# Patient Record
Sex: FEMALE | Race: WHITE | NOT HISPANIC OR LATINO | Employment: OTHER | URBAN - METROPOLITAN AREA
[De-identification: names, ages, dates, MRNs, and addresses within clinical notes are randomized per-mention and may not be internally consistent; named-entity substitution may affect disease eponyms.]

---

## 2017-02-01 ENCOUNTER — HOSPITAL ENCOUNTER (OUTPATIENT)
Dept: RADIOLOGY | Facility: HOSPITAL | Age: 72
Discharge: HOME | End: 2017-02-01
Attending: ORTHOPAEDIC SURGERY
Payer: COMMERCIAL

## 2017-02-01 ENCOUNTER — ALLSCRIPTS OFFICE VISIT (OUTPATIENT)
Dept: OTHER | Facility: OTHER | Age: 72
End: 2017-02-01

## 2017-02-01 DIAGNOSIS — M16.11 PRIMARY OSTEOARTHRITIS OF RIGHT HIP: ICD-10-CM

## 2017-02-01 DIAGNOSIS — I48.91 ATRIAL FIBRILLATION (HCC): ICD-10-CM

## 2017-02-01 DIAGNOSIS — Z48.89 ENCOUNTER FOR OTHER SPECIFIED SURGICAL AFTERCARE: ICD-10-CM

## 2017-02-01 PROCEDURE — 73100 X-RAY EXAM OF WRIST: CPT

## 2017-03-01 ENCOUNTER — ALLSCRIPTS OFFICE VISIT (OUTPATIENT)
Dept: OTHER | Facility: OTHER | Age: 72
End: 2017-03-01

## 2017-03-01 ENCOUNTER — HOSPITAL ENCOUNTER (OUTPATIENT)
Dept: RADIOLOGY | Facility: HOSPITAL | Age: 72
Discharge: HOME | End: 2017-03-01
Attending: ORTHOPAEDIC SURGERY
Payer: COMMERCIAL

## 2017-03-01 DIAGNOSIS — Z48.89 ENCOUNTER FOR OTHER SPECIFIED SURGICAL AFTERCARE: ICD-10-CM

## 2017-03-01 PROCEDURE — 73100 X-RAY EXAM OF WRIST: CPT

## 2017-03-07 ENCOUNTER — TRANSCRIBE ORDERS (OUTPATIENT)
Dept: ADMINISTRATIVE | Facility: HOSPITAL | Age: 72
End: 2017-03-07

## 2017-03-07 DIAGNOSIS — C55 MALIGNANT NEOPLASM OF UTERUS, PART UNSPECIFIED (HCC): ICD-10-CM

## 2017-03-07 DIAGNOSIS — C55 MALIGNANT NEOPLASM OF UTERUS (HCC): ICD-10-CM

## 2017-03-07 DIAGNOSIS — C78.00 MALIGNANT NEOPLASM METASTATIC TO LUNG, UNSPECIFIED LATERALITY (HCC): Primary | ICD-10-CM

## 2017-03-07 DIAGNOSIS — C78.00 SECONDARY MALIGNANT NEOPLASM OF LUNG (HCC): ICD-10-CM

## 2017-03-07 DIAGNOSIS — C78.7 SECONDARY MALIGNANT NEOPLASM OF LIVER AND INTRAHEPATIC BILE DUCT (HCC): ICD-10-CM

## 2017-03-17 ENCOUNTER — HOSPITAL ENCOUNTER (OUTPATIENT)
Dept: INFUSION CENTER | Facility: CLINIC | Age: 72
Discharge: HOME | End: 2017-03-17
Payer: COMMERCIAL

## 2017-03-17 LAB
ALBUMIN SERPL BCP-MCNC: 3.2 G/DL (ref 3.5–5)
ALP SERPL-CCNC: 354 U/L (ref 46–116)
ALT SERPL W P-5'-P-CCNC: 32 U/L (ref 12–78)
ANION GAP SERPL CALCULATED.3IONS-SCNC: 8 MMOL/L (ref 4–13)
APTT PPP: 32 SECONDS (ref 24–36)
AST SERPL W P-5'-P-CCNC: 42 U/L (ref 5–45)
BASOPHILS # BLD AUTO: 0.03 THOUSANDS/ΜL (ref 0–0.1)
BASOPHILS NFR BLD AUTO: 0 % (ref 0–1)
BILIRUB SERPL-MCNC: 0.7 MG/DL (ref 0.2–1)
BUN SERPL-MCNC: 30 MG/DL (ref 5–25)
CALCIUM SERPL-MCNC: 8.7 MG/DL (ref 8.3–10.1)
CANCER AG125 SERPL-ACNC: 25.2 U/ML (ref 0–30)
CHLORIDE SERPL-SCNC: 104 MMOL/L (ref 100–108)
CO2 SERPL-SCNC: 28 MMOL/L (ref 21–32)
CREAT SERPL-MCNC: 0.97 MG/DL (ref 0.6–1.3)
EOSINOPHIL # BLD AUTO: 0.4 THOUSAND/ΜL (ref 0–0.61)
EOSINOPHIL NFR BLD AUTO: 6 % (ref 0–6)
ERYTHROCYTE [DISTWIDTH] IN BLOOD BY AUTOMATED COUNT: 16.1 % (ref 11.6–15.1)
GFR SERPL CREATININE-BSD FRML MDRD: 56.9 ML/MIN/1.73SQ M
GLUCOSE SERPL-MCNC: 102 MG/DL (ref 65–140)
HCT VFR BLD AUTO: 33.2 % (ref 34.8–46.1)
HGB BLD-MCNC: 10.3 G/DL (ref 11.5–15.4)
INR PPP: 1.34 (ref 0.86–1.16)
LYMPHOCYTES # BLD AUTO: 0.62 THOUSANDS/ΜL (ref 0.6–4.47)
LYMPHOCYTES NFR BLD AUTO: 9 % (ref 14–44)
MCH RBC QN AUTO: 30.8 PG (ref 26.8–34.3)
MCHC RBC AUTO-ENTMCNC: 31 G/DL (ref 31.4–37.4)
MCV RBC AUTO: 99 FL (ref 82–98)
MONOCYTES # BLD AUTO: 0.48 THOUSAND/ΜL (ref 0.17–1.22)
MONOCYTES NFR BLD AUTO: 7 % (ref 4–12)
NEUTROPHILS # BLD AUTO: 5.16 THOUSANDS/ΜL (ref 1.85–7.62)
NEUTS SEG NFR BLD AUTO: 78 % (ref 43–75)
PLATELET # BLD AUTO: 337 THOUSANDS/UL (ref 149–390)
PMV BLD AUTO: 8.7 FL (ref 8.9–12.7)
POTASSIUM SERPL-SCNC: 4 MMOL/L (ref 3.5–5.3)
PROT SERPL-MCNC: 7.2 G/DL (ref 6.4–8.2)
PROTHROMBIN TIME: 16.2 SECONDS (ref 12–14.3)
RBC # BLD AUTO: 3.34 MILLION/UL (ref 3.81–5.12)
SODIUM SERPL-SCNC: 140 MMOL/L (ref 136–145)
WBC # BLD AUTO: 6.69 THOUSAND/UL (ref 4.31–10.16)

## 2017-03-17 PROCEDURE — 85730 THROMBOPLASTIN TIME PARTIAL: CPT | Performed by: NURSE PRACTITIONER

## 2017-03-17 PROCEDURE — 80053 COMPREHEN METABOLIC PANEL: CPT | Performed by: NURSE PRACTITIONER

## 2017-03-17 PROCEDURE — 85610 PROTHROMBIN TIME: CPT | Performed by: NURSE PRACTITIONER

## 2017-03-17 PROCEDURE — 85025 COMPLETE CBC W/AUTO DIFF WBC: CPT | Performed by: NURSE PRACTITIONER

## 2017-03-17 PROCEDURE — 86304 IMMUNOASSAY TUMOR CA 125: CPT | Performed by: NURSE PRACTITIONER

## 2017-03-17 RX ADMIN — HEPARIN 300 UNITS: 100 SYRINGE at 14:00

## 2017-03-17 NOTE — PROGRESS NOTES
Pt offers no complaints today  Port accessed and labs drawn   Has next appointment made, declines AVS

## 2017-03-17 NOTE — PLAN OF CARE
Problem: Potential for Falls  Goal: Patient will remain free of falls  INTERVENTIONS:  - Assess patient frequently for physical needs  - Identify cognitive and physical deficits and behaviors that affect risk of falls    - [place] fall precautions as indicated by assessment   - Educate patient/family on patient safety including physical limitations  - Instruct patient to call for assistance with activity based on assessment  - Modify environment to reduce risk of injury  - Consider OT/PT consult to assist with strengthening/mobility   Outcome: Progressing

## 2017-03-21 ENCOUNTER — HOSPITAL ENCOUNTER (OUTPATIENT)
Dept: CT IMAGING | Facility: HOSPITAL | Age: 72
Discharge: HOME | End: 2017-03-21
Attending: OBSTETRICS & GYNECOLOGY
Payer: COMMERCIAL

## 2017-03-21 DIAGNOSIS — C78.00 SECONDARY MALIGNANT NEOPLASM OF LUNG (HCC): ICD-10-CM

## 2017-03-21 DIAGNOSIS — C78.7 SECONDARY MALIGNANT NEOPLASM OF LIVER AND INTRAHEPATIC BILE DUCT (HCC): ICD-10-CM

## 2017-03-21 DIAGNOSIS — C55 MALIGNANT NEOPLASM OF UTERUS (HCC): ICD-10-CM

## 2017-03-21 PROCEDURE — 74177 CT ABD & PELVIS W/CONTRAST: CPT

## 2017-03-21 PROCEDURE — 71260 CT THORAX DX C+: CPT

## 2017-03-21 PROCEDURE — 70470 CT HEAD/BRAIN W/O & W/DYE: CPT

## 2017-03-21 RX ADMIN — IOHEXOL 100 ML: 350 INJECTION, SOLUTION INTRAVENOUS at 17:09

## 2017-04-06 ENCOUNTER — ALLSCRIPTS OFFICE VISIT (OUTPATIENT)
Dept: OTHER | Facility: OTHER | Age: 72
End: 2017-04-06

## 2017-04-27 ENCOUNTER — ALLSCRIPTS OFFICE VISIT (OUTPATIENT)
Dept: OTHER | Facility: OTHER | Age: 72
End: 2017-04-27

## 2017-05-19 DIAGNOSIS — I82.409 ACUTE EMBOLISM AND THROMBOSIS OF DEEP VEIN OF LOWER EXTREMITY (HCC): ICD-10-CM

## 2017-05-19 DIAGNOSIS — I48.91 ATRIAL FIBRILLATION (HCC): ICD-10-CM

## 2017-05-19 DIAGNOSIS — I10 ESSENTIAL (PRIMARY) HYPERTENSION: ICD-10-CM

## 2017-05-19 DIAGNOSIS — M16.11 PRIMARY OSTEOARTHRITIS OF RIGHT HIP: ICD-10-CM

## 2017-05-25 ENCOUNTER — ALLSCRIPTS OFFICE VISIT (OUTPATIENT)
Dept: OTHER | Facility: OTHER | Age: 72
End: 2017-05-25

## 2017-05-29 ENCOUNTER — HOSPITAL ENCOUNTER (INPATIENT)
Facility: HOSPITAL | Age: 72
LOS: 5 days | Discharge: NURSING, REHAB OR LONG-TERM CARE | DRG: 315 | End: 2017-06-04
Attending: EMERGENCY MEDICINE | Admitting: HOSPITALIST
Payer: COMMERCIAL

## 2017-05-29 ENCOUNTER — GENERIC CONVERSION - ENCOUNTER (OUTPATIENT)
Dept: OTHER | Facility: OTHER | Age: 72
End: 2017-05-29

## 2017-05-29 ENCOUNTER — APPOINTMENT (EMERGENCY)
Dept: RADIOLOGY | Facility: HOSPITAL | Age: 72
DRG: 315 | End: 2017-05-29
Payer: COMMERCIAL

## 2017-05-29 DIAGNOSIS — M25.559 ARTHRALGIA OF HIP: ICD-10-CM

## 2017-05-29 DIAGNOSIS — C55 MALIGNANT NEOPLASM OF UTERUS (HCC): ICD-10-CM

## 2017-05-29 DIAGNOSIS — R55 SYNCOPE: ICD-10-CM

## 2017-05-29 DIAGNOSIS — I27.20 PULMONARY HYPERTENSION (HCC): ICD-10-CM

## 2017-05-29 DIAGNOSIS — I48.91 ATRIAL FIBRILLATION WITH RVR (HCC): Primary | ICD-10-CM

## 2017-05-29 PROBLEM — I48.0 PAF (PAROXYSMAL ATRIAL FIBRILLATION) (HCC): Status: ACTIVE | Noted: 2017-05-29

## 2017-05-29 PROBLEM — I10 BENIGN ESSENTIAL HYPERTENSION: Status: ACTIVE | Noted: 2017-05-29

## 2017-05-29 PROBLEM — I35.0 AORTIC VALVE STENOSIS: Status: ACTIVE | Noted: 2017-05-29

## 2017-05-29 PROBLEM — I82.409 DEEP VEIN THROMBOSIS (DVT) (HCC): Status: ACTIVE | Noted: 2017-05-29

## 2017-05-29 PROBLEM — E78.5 DYSLIPIDEMIA: Status: ACTIVE | Noted: 2017-05-29

## 2017-05-29 PROBLEM — E66.01 MORBID OBESITY (HCC): Status: ACTIVE | Noted: 2017-05-29

## 2017-05-29 PROBLEM — M16.9 OSTEOARTHRITIS OF HIP: Status: ACTIVE | Noted: 2017-05-29

## 2017-05-29 LAB
ANION GAP SERPL CALCULATED.3IONS-SCNC: 10 MMOL/L (ref 4–13)
APTT PPP: 30 SECONDS (ref 23–35)
BASOPHILS # BLD AUTO: 0.02 THOUSANDS/ΜL (ref 0–0.1)
BASOPHILS NFR BLD AUTO: 0 % (ref 0–1)
BUN SERPL-MCNC: 38 MG/DL (ref 5–25)
CALCIUM SERPL-MCNC: 8.6 MG/DL (ref 8.3–10.1)
CHLORIDE SERPL-SCNC: 107 MMOL/L (ref 100–108)
CO2 SERPL-SCNC: 24 MMOL/L (ref 21–32)
CREAT SERPL-MCNC: 1.22 MG/DL (ref 0.6–1.3)
EOSINOPHIL # BLD AUTO: 0.1 THOUSAND/ΜL (ref 0–0.61)
EOSINOPHIL NFR BLD AUTO: 1 % (ref 0–6)
ERYTHROCYTE [DISTWIDTH] IN BLOOD BY AUTOMATED COUNT: 16.2 % (ref 11.6–15.1)
GFR SERPL CREATININE-BSD FRML MDRD: 43.7 ML/MIN/1.73SQ M
GLUCOSE SERPL-MCNC: 104 MG/DL (ref 65–140)
HCT VFR BLD AUTO: 32.8 % (ref 34.8–46.1)
HGB BLD-MCNC: 10.8 G/DL (ref 11.5–15.4)
INR PPP: 1.32 (ref 0.86–1.16)
LYMPHOCYTES # BLD AUTO: 0.56 THOUSANDS/ΜL (ref 0.6–4.47)
LYMPHOCYTES NFR BLD AUTO: 7 % (ref 14–44)
MCH RBC QN AUTO: 31.8 PG (ref 26.8–34.3)
MCHC RBC AUTO-ENTMCNC: 32.9 G/DL (ref 31.4–37.4)
MCV RBC AUTO: 97 FL (ref 82–98)
MONOCYTES # BLD AUTO: 0.6 THOUSAND/ΜL (ref 0.17–1.22)
MONOCYTES NFR BLD AUTO: 8 % (ref 4–12)
NEUTROPHILS # BLD AUTO: 6.52 THOUSANDS/ΜL (ref 1.85–7.62)
NEUTS SEG NFR BLD AUTO: 84 % (ref 43–75)
NRBC BLD AUTO-RTO: 0 /100 WBCS
PLATELET # BLD AUTO: 247 THOUSANDS/UL (ref 149–390)
PMV BLD AUTO: 9.4 FL (ref 8.9–12.7)
POTASSIUM SERPL-SCNC: 4.1 MMOL/L (ref 3.5–5.3)
PROTHROMBIN TIME: 16.5 SECONDS (ref 12.1–14.4)
RBC # BLD AUTO: 3.4 MILLION/UL (ref 3.81–5.12)
SODIUM SERPL-SCNC: 141 MMOL/L (ref 136–145)
SPECIMEN SOURCE: NORMAL
TROPONIN I BLD-MCNC: 0.03 NG/ML (ref 0–0.08)
WBC # BLD AUTO: 7.85 THOUSAND/UL (ref 4.31–10.16)

## 2017-05-29 PROCEDURE — 83735 ASSAY OF MAGNESIUM: CPT | Performed by: EMERGENCY MEDICINE

## 2017-05-29 PROCEDURE — 84484 ASSAY OF TROPONIN QUANT: CPT

## 2017-05-29 PROCEDURE — 71020 HB CHEST X-RAY 2VW FRONTAL&LATL: CPT

## 2017-05-29 PROCEDURE — 85610 PROTHROMBIN TIME: CPT | Performed by: EMERGENCY MEDICINE

## 2017-05-29 PROCEDURE — 80048 BASIC METABOLIC PNL TOTAL CA: CPT | Performed by: EMERGENCY MEDICINE

## 2017-05-29 PROCEDURE — 93005 ELECTROCARDIOGRAM TRACING: CPT

## 2017-05-29 PROCEDURE — 85025 COMPLETE CBC W/AUTO DIFF WBC: CPT | Performed by: EMERGENCY MEDICINE

## 2017-05-29 PROCEDURE — 99285 EMERGENCY DEPT VISIT HI MDM: CPT

## 2017-05-29 PROCEDURE — 96360 HYDRATION IV INFUSION INIT: CPT

## 2017-05-29 PROCEDURE — 85730 THROMBOPLASTIN TIME PARTIAL: CPT | Performed by: EMERGENCY MEDICINE

## 2017-05-29 PROCEDURE — 36415 COLL VENOUS BLD VENIPUNCTURE: CPT | Performed by: EMERGENCY MEDICINE

## 2017-05-29 PROCEDURE — A9270 NON-COVERED ITEM OR SERVICE: HCPCS | Performed by: EMERGENCY MEDICINE

## 2017-05-29 RX ORDER — SENNOSIDES 8.6 MG
1 TABLET ORAL DAILY
Status: DISCONTINUED | OUTPATIENT
Start: 2017-05-30 | End: 2017-05-30

## 2017-05-29 RX ORDER — TRAMADOL HYDROCHLORIDE 50 MG/1
50 TABLET ORAL EVERY 6 HOURS PRN
Status: DISCONTINUED | OUTPATIENT
Start: 2017-05-29 | End: 2017-05-30

## 2017-05-29 RX ORDER — CALCIUM CARBONATE 500(1250)
1 TABLET ORAL
Status: DISCONTINUED | OUTPATIENT
Start: 2017-05-30 | End: 2017-06-04 | Stop reason: HOSPADM

## 2017-05-29 RX ORDER — LISINOPRIL 20 MG/1
40 TABLET ORAL DAILY
Status: DISCONTINUED | OUTPATIENT
Start: 2017-05-30 | End: 2017-05-30

## 2017-05-29 RX ORDER — ASPIRIN 81 MG/1
81 TABLET, CHEWABLE ORAL DAILY
Status: DISCONTINUED | OUTPATIENT
Start: 2017-05-30 | End: 2017-06-04 | Stop reason: HOSPADM

## 2017-05-29 RX ORDER — ESCITALOPRAM OXALATE 20 MG/1
20 TABLET ORAL DAILY
Status: DISCONTINUED | OUTPATIENT
Start: 2017-05-30 | End: 2017-06-04 | Stop reason: HOSPADM

## 2017-05-29 RX ORDER — HYDROCHLOROTHIAZIDE 25 MG/1
25 TABLET ORAL DAILY
Status: DISCONTINUED | OUTPATIENT
Start: 2017-05-30 | End: 2017-05-30

## 2017-05-29 RX ORDER — CLONAZEPAM 0.5 MG/1
0.5 TABLET ORAL 2 TIMES DAILY PRN
Status: DISCONTINUED | OUTPATIENT
Start: 2017-05-29 | End: 2017-05-31

## 2017-05-29 RX ORDER — ACETAMINOPHEN 325 MG/1
650 TABLET ORAL EVERY 6 HOURS PRN
Status: DISCONTINUED | OUTPATIENT
Start: 2017-05-29 | End: 2017-05-30

## 2017-05-29 RX ORDER — FERROUS SULFATE 325(65) MG
325 TABLET ORAL
Status: DISCONTINUED | OUTPATIENT
Start: 2017-05-30 | End: 2017-06-04 | Stop reason: HOSPADM

## 2017-05-29 RX ORDER — MELATONIN
1000 DAILY
Status: DISCONTINUED | OUTPATIENT
Start: 2017-05-30 | End: 2017-06-04 | Stop reason: HOSPADM

## 2017-05-29 RX ORDER — ALPRAZOLAM 0.25 MG/1
0.25 TABLET ORAL
Status: DISCONTINUED | OUTPATIENT
Start: 2017-05-29 | End: 2017-06-04 | Stop reason: HOSPADM

## 2017-05-29 RX ORDER — OXYCODONE HYDROCHLORIDE 5 MG/1
5 TABLET ORAL ONCE
Status: COMPLETED | OUTPATIENT
Start: 2017-05-29 | End: 2017-05-29

## 2017-05-29 RX ORDER — ONDANSETRON 2 MG/ML
4 INJECTION INTRAMUSCULAR; INTRAVENOUS EVERY 6 HOURS PRN
Status: DISCONTINUED | OUTPATIENT
Start: 2017-05-29 | End: 2017-06-01

## 2017-05-29 RX ORDER — DOCUSATE SODIUM 100 MG/1
100 CAPSULE, LIQUID FILLED ORAL 2 TIMES DAILY
Status: DISCONTINUED | OUTPATIENT
Start: 2017-05-30 | End: 2017-05-30

## 2017-05-29 RX ADMIN — SODIUM CHLORIDE 1000 ML: 0.9 INJECTION, SOLUTION INTRAVENOUS at 21:49

## 2017-05-29 RX ADMIN — OXYCODONE HYDROCHLORIDE 5 MG: 5 TABLET ORAL at 22:11

## 2017-05-30 ENCOUNTER — GENERIC CONVERSION - ENCOUNTER (OUTPATIENT)
Dept: OTHER | Facility: OTHER | Age: 72
End: 2017-05-30

## 2017-05-30 ENCOUNTER — APPOINTMENT (INPATIENT)
Dept: NON INVASIVE DIAGNOSTICS | Facility: HOSPITAL | Age: 72
DRG: 315 | End: 2017-05-30
Payer: COMMERCIAL

## 2017-05-30 LAB
ANION GAP SERPL CALCULATED.3IONS-SCNC: 7 MMOL/L (ref 4–13)
ATRIAL RATE: 133 BPM
ATRIAL RATE: 68 BPM
ATRIAL RATE: 87 BPM
BUN SERPL-MCNC: 37 MG/DL (ref 5–25)
CALCIUM SERPL-MCNC: 8.2 MG/DL (ref 8.3–10.1)
CHLORIDE SERPL-SCNC: 108 MMOL/L (ref 100–108)
CO2 SERPL-SCNC: 25 MMOL/L (ref 21–32)
CREAT SERPL-MCNC: 0.96 MG/DL (ref 0.6–1.3)
GFR SERPL CREATININE-BSD FRML MDRD: 57.6 ML/MIN/1.73SQ M
GLUCOSE SERPL-MCNC: 95 MG/DL (ref 65–140)
MAGNESIUM SERPL-MCNC: 1.8 MG/DL (ref 1.6–2.6)
MAGNESIUM SERPL-MCNC: 1.8 MG/DL (ref 1.6–2.6)
P AXIS: 58 DEGREES
P AXIS: 94 DEGREES
PHOSPHATE SERPL-MCNC: 2.7 MG/DL (ref 2.3–4.1)
POTASSIUM SERPL-SCNC: 3.3 MMOL/L (ref 3.5–5.3)
PR INTERVAL: 180 MS
PR INTERVAL: 186 MS
QRS AXIS: -1 DEGREES
QRS AXIS: -1 DEGREES
QRS AXIS: 11 DEGREES
QRSD INTERVAL: 80 MS
QRSD INTERVAL: 84 MS
QRSD INTERVAL: 86 MS
QT INTERVAL: 336 MS
QT INTERVAL: 368 MS
QT INTERVAL: 426 MS
QTC INTERVAL: 435 MS
QTC INTERVAL: 442 MS
QTC INTERVAL: 452 MS
SODIUM SERPL-SCNC: 140 MMOL/L (ref 136–145)
T WAVE AXIS: 101 DEGREES
T WAVE AXIS: 122 DEGREES
T WAVE AXIS: 122 DEGREES
TROPONIN I SERPL-MCNC: 0.05 NG/ML
TROPONIN I SERPL-MCNC: 0.05 NG/ML
TSH SERPL DL<=0.05 MIU/L-ACNC: 1.27 UIU/ML (ref 0.36–3.74)
VENTRICULAR RATE: 101 BPM
VENTRICULAR RATE: 68 BPM
VENTRICULAR RATE: 87 BPM

## 2017-05-30 PROCEDURE — A9270 NON-COVERED ITEM OR SERVICE: HCPCS | Performed by: HOSPITALIST

## 2017-05-30 PROCEDURE — 83735 ASSAY OF MAGNESIUM: CPT | Performed by: HOSPITALIST

## 2017-05-30 PROCEDURE — A9270 NON-COVERED ITEM OR SERVICE: HCPCS | Performed by: NURSE PRACTITIONER

## 2017-05-30 PROCEDURE — 84484 ASSAY OF TROPONIN QUANT: CPT | Performed by: HOSPITALIST

## 2017-05-30 PROCEDURE — A9270 NON-COVERED ITEM OR SERVICE: HCPCS | Performed by: INTERNAL MEDICINE

## 2017-05-30 PROCEDURE — 93306 TTE W/DOPPLER COMPLETE: CPT

## 2017-05-30 PROCEDURE — 94760 N-INVAS EAR/PLS OXIMETRY 1: CPT

## 2017-05-30 PROCEDURE — 93005 ELECTROCARDIOGRAM TRACING: CPT | Performed by: HOSPITALIST

## 2017-05-30 PROCEDURE — 84443 ASSAY THYROID STIM HORMONE: CPT | Performed by: HOSPITALIST

## 2017-05-30 PROCEDURE — 84100 ASSAY OF PHOSPHORUS: CPT | Performed by: HOSPITALIST

## 2017-05-30 PROCEDURE — 93005 ELECTROCARDIOGRAM TRACING: CPT | Performed by: NURSE PRACTITIONER

## 2017-05-30 PROCEDURE — 80048 BASIC METABOLIC PNL TOTAL CA: CPT | Performed by: HOSPITALIST

## 2017-05-30 RX ORDER — POTASSIUM CHLORIDE 20 MEQ/1
20 TABLET, EXTENDED RELEASE ORAL ONCE
Status: COMPLETED | OUTPATIENT
Start: 2017-05-30 | End: 2017-05-30

## 2017-05-30 RX ORDER — SOTALOL HYDROCHLORIDE 80 MG/1
80 TABLET ORAL EVERY 12 HOURS SCHEDULED
Status: DISCONTINUED | OUTPATIENT
Start: 2017-05-30 | End: 2017-06-04 | Stop reason: HOSPADM

## 2017-05-30 RX ORDER — POTASSIUM CHLORIDE 20 MEQ/1
40 TABLET, EXTENDED RELEASE ORAL ONCE
Status: COMPLETED | OUTPATIENT
Start: 2017-05-30 | End: 2017-05-30

## 2017-05-30 RX ORDER — OXYCODONE HYDROCHLORIDE AND ACETAMINOPHEN 5; 325 MG/1; MG/1
1 TABLET ORAL EVERY 4 HOURS PRN
Status: DISCONTINUED | OUTPATIENT
Start: 2017-05-30 | End: 2017-05-30

## 2017-05-30 RX ORDER — AMOXICILLIN 250 MG
1 CAPSULE ORAL 2 TIMES DAILY
Status: DISCONTINUED | OUTPATIENT
Start: 2017-05-30 | End: 2017-06-04 | Stop reason: HOSPADM

## 2017-05-30 RX ORDER — POLYETHYLENE GLYCOL 3350 17 G/17G
17 POWDER, FOR SOLUTION ORAL DAILY PRN
Status: DISCONTINUED | OUTPATIENT
Start: 2017-05-30 | End: 2017-06-02 | Stop reason: DRUGHIGH

## 2017-05-30 RX ORDER — ACETAMINOPHEN 325 MG/1
975 TABLET ORAL EVERY 8 HOURS SCHEDULED
Status: DISCONTINUED | OUTPATIENT
Start: 2017-05-30 | End: 2017-06-04 | Stop reason: HOSPADM

## 2017-05-30 RX ORDER — MAGNESIUM SULFATE HEPTAHYDRATE 40 MG/ML
2 INJECTION, SOLUTION INTRAVENOUS ONCE
Status: COMPLETED | OUTPATIENT
Start: 2017-05-30 | End: 2017-06-02

## 2017-05-30 RX ORDER — OXYCODONE HYDROCHLORIDE 5 MG/1
5 TABLET ORAL
Status: DISCONTINUED | OUTPATIENT
Start: 2017-05-30 | End: 2017-06-02

## 2017-05-30 RX ADMIN — APIXABAN 5 MG: 5 TABLET, FILM COATED ORAL at 08:48

## 2017-05-30 RX ADMIN — OXYCODONE HYDROCHLORIDE AND ACETAMINOPHEN 1 TABLET: 5; 325 TABLET ORAL at 06:04

## 2017-05-30 RX ADMIN — SOTALOL HYDROCHLORIDE 80 MG: 80 TABLET ORAL at 13:39

## 2017-05-30 RX ADMIN — APIXABAN 5 MG: 5 TABLET, FILM COATED ORAL at 17:29

## 2017-05-30 RX ADMIN — OXYCODONE HYDROCHLORIDE 5 MG: 5 TABLET ORAL at 21:29

## 2017-05-30 RX ADMIN — POTASSIUM CHLORIDE 40 MEQ: 1500 TABLET, EXTENDED RELEASE ORAL at 09:38

## 2017-05-30 RX ADMIN — DOCUSATE SODIUM 100 MG: 100 CAPSULE, LIQUID FILLED ORAL at 08:48

## 2017-05-30 RX ADMIN — VITAMIN D, TAB 1000IU (100/BT) 1000 UNITS: 25 TAB at 08:48

## 2017-05-30 RX ADMIN — ESCITALOPRAM OXALATE 20 MG: 20 TABLET ORAL at 08:51

## 2017-05-30 RX ADMIN — ACETAMINOPHEN 975 MG: 325 TABLET, FILM COATED ORAL at 21:28

## 2017-05-30 RX ADMIN — TRAMADOL HYDROCHLORIDE 50 MG: 50 TABLET, COATED ORAL at 00:30

## 2017-05-30 RX ADMIN — Medication 1 TABLET: at 08:48

## 2017-05-30 RX ADMIN — ALPRAZOLAM 0.25 MG: 0.25 TABLET ORAL at 00:30

## 2017-05-30 RX ADMIN — SOTALOL HYDROCHLORIDE 80 MG: 80 TABLET ORAL at 22:10

## 2017-05-30 RX ADMIN — OXYCODONE HYDROCHLORIDE 5 MG: 5 TABLET ORAL at 15:11

## 2017-05-30 RX ADMIN — POTASSIUM CHLORIDE 20 MEQ: 1500 TABLET, EXTENDED RELEASE ORAL at 15:11

## 2017-05-30 RX ADMIN — ASPIRIN 81 MG: 81 TABLET, CHEWABLE ORAL at 08:49

## 2017-05-30 RX ADMIN — OXYCODONE HYDROCHLORIDE 5 MG: 5 TABLET ORAL at 09:56

## 2017-05-30 RX ADMIN — ACETAMINOPHEN 975 MG: 325 TABLET, FILM COATED ORAL at 15:10

## 2017-05-30 RX ADMIN — METOPROLOL TARTRATE 12.5 MG: 25 TABLET ORAL at 11:00

## 2017-05-30 RX ADMIN — Medication 325 MG: at 08:49

## 2017-05-30 RX ADMIN — MAGNESIUM SULFATE HEPTAHYDRATE 2 G: 40 INJECTION, SOLUTION INTRAVENOUS at 17:30

## 2017-05-30 RX ADMIN — Medication 1 TABLET: at 17:29

## 2017-05-30 RX ADMIN — OXYCODONE HYDROCHLORIDE 5 MG: 5 TABLET ORAL at 18:55

## 2017-05-30 RX ADMIN — SENNOSIDES 8.6 MG: 8.6 TABLET, FILM COATED ORAL at 08:48

## 2017-05-31 ENCOUNTER — GENERIC CONVERSION - ENCOUNTER (OUTPATIENT)
Dept: OTHER | Facility: OTHER | Age: 72
End: 2017-05-31

## 2017-05-31 LAB
ANION GAP SERPL CALCULATED.3IONS-SCNC: 6 MMOL/L (ref 4–13)
ATRIAL RATE: 0 BPM
ATRIAL RATE: 67 BPM
ATRIAL RATE: 68 BPM
BUN SERPL-MCNC: 30 MG/DL (ref 5–25)
CALCIUM SERPL-MCNC: 8.9 MG/DL (ref 8.3–10.1)
CHLORIDE SERPL-SCNC: 105 MMOL/L (ref 100–108)
CO2 SERPL-SCNC: 26 MMOL/L (ref 21–32)
CREAT SERPL-MCNC: 0.76 MG/DL (ref 0.6–1.3)
GFR SERPL CREATININE-BSD FRML MDRD: >60 ML/MIN/1.73SQ M
GLUCOSE SERPL-MCNC: 87 MG/DL (ref 65–140)
MAGNESIUM SERPL-MCNC: 2.4 MG/DL (ref 1.6–2.6)
P AXIS: 87 DEGREES
POTASSIUM SERPL-SCNC: 4.4 MMOL/L (ref 3.5–5.3)
PR INTERVAL: 174 MS
PR INTERVAL: 178 MS
QRS AXIS: -3 DEGREES
QRS AXIS: 0 DEGREES
QRS AXIS: 183 DEGREES
QRSD INTERVAL: 0 MS
QRSD INTERVAL: 74 MS
QRSD INTERVAL: 76 MS
QT INTERVAL: 0 MS
QT INTERVAL: 426 MS
QT INTERVAL: 432 MS
QTC INTERVAL: 0 MS
QTC INTERVAL: 450 MS
QTC INTERVAL: 459 MS
SODIUM SERPL-SCNC: 137 MMOL/L (ref 136–145)
T WAVE AXIS: 0 DEGREES
T WAVE AXIS: 108 DEGREES
T WAVE AXIS: 117 DEGREES
VENTRICULAR RATE: 0 BPM
VENTRICULAR RATE: 67 BPM
VENTRICULAR RATE: 68 BPM

## 2017-05-31 PROCEDURE — A9270 NON-COVERED ITEM OR SERVICE: HCPCS | Performed by: INTERNAL MEDICINE

## 2017-05-31 PROCEDURE — A9270 NON-COVERED ITEM OR SERVICE: HCPCS | Performed by: HOSPITALIST

## 2017-05-31 PROCEDURE — 93005 ELECTROCARDIOGRAM TRACING: CPT | Performed by: NURSE PRACTITIONER

## 2017-05-31 PROCEDURE — 83735 ASSAY OF MAGNESIUM: CPT | Performed by: NURSE PRACTITIONER

## 2017-05-31 PROCEDURE — 80048 BASIC METABOLIC PNL TOTAL CA: CPT | Performed by: NURSE PRACTITIONER

## 2017-05-31 PROCEDURE — 93005 ELECTROCARDIOGRAM TRACING: CPT

## 2017-05-31 PROCEDURE — A9270 NON-COVERED ITEM OR SERVICE: HCPCS | Performed by: NURSE PRACTITIONER

## 2017-05-31 RX ORDER — OXYCODONE HYDROCHLORIDE 5 MG/1
5 TABLET ORAL EVERY 4 HOURS PRN
Status: DISCONTINUED | OUTPATIENT
Start: 2017-05-31 | End: 2017-06-04 | Stop reason: HOSPADM

## 2017-05-31 RX ORDER — ALPRAZOLAM 0.25 MG/1
0.25 TABLET ORAL 2 TIMES DAILY
Status: DISCONTINUED | OUTPATIENT
Start: 2017-05-31 | End: 2017-06-04 | Stop reason: HOSPADM

## 2017-05-31 RX ADMIN — VITAMIN D, TAB 1000IU (100/BT) 1000 UNITS: 25 TAB at 08:55

## 2017-05-31 RX ADMIN — APIXABAN 5 MG: 5 TABLET, FILM COATED ORAL at 08:55

## 2017-05-31 RX ADMIN — SOTALOL HYDROCHLORIDE 80 MG: 80 TABLET ORAL at 21:17

## 2017-05-31 RX ADMIN — Medication 1 TABLET: at 18:07

## 2017-05-31 RX ADMIN — Medication 1 TABLET: at 08:55

## 2017-05-31 RX ADMIN — OXYCODONE HYDROCHLORIDE 5 MG: 5 TABLET ORAL at 08:55

## 2017-05-31 RX ADMIN — OXYCODONE HYDROCHLORIDE 5 MG: 5 TABLET ORAL at 04:44

## 2017-05-31 RX ADMIN — ALPRAZOLAM 0.25 MG: 0.25 TABLET ORAL at 14:08

## 2017-05-31 RX ADMIN — OXYCODONE HYDROCHLORIDE 5 MG: 5 TABLET ORAL at 21:17

## 2017-05-31 RX ADMIN — ASPIRIN 81 MG: 81 TABLET, CHEWABLE ORAL at 08:54

## 2017-05-31 RX ADMIN — OXYCODONE HYDROCHLORIDE 5 MG: 5 TABLET ORAL at 18:08

## 2017-05-31 RX ADMIN — ESCITALOPRAM OXALATE 20 MG: 20 TABLET ORAL at 08:55

## 2017-05-31 RX ADMIN — ACETAMINOPHEN 975 MG: 325 TABLET, FILM COATED ORAL at 21:17

## 2017-05-31 RX ADMIN — ALPRAZOLAM 0.25 MG: 0.25 TABLET ORAL at 21:18

## 2017-05-31 RX ADMIN — Medication 1 TABLET: at 07:36

## 2017-05-31 RX ADMIN — SOTALOL HYDROCHLORIDE 80 MG: 80 TABLET ORAL at 08:54

## 2017-05-31 RX ADMIN — APIXABAN 5 MG: 5 TABLET, FILM COATED ORAL at 18:07

## 2017-05-31 RX ADMIN — ACETAMINOPHEN 975 MG: 325 TABLET, FILM COATED ORAL at 04:44

## 2017-05-31 RX ADMIN — OXYCODONE HYDROCHLORIDE 5 MG: 5 TABLET ORAL at 14:09

## 2017-05-31 RX ADMIN — Medication 325 MG: at 07:36

## 2017-05-31 RX ADMIN — ACETAMINOPHEN 975 MG: 325 TABLET, FILM COATED ORAL at 14:09

## 2017-06-01 ENCOUNTER — GENERIC CONVERSION - ENCOUNTER (OUTPATIENT)
Dept: OTHER | Facility: OTHER | Age: 72
End: 2017-06-01

## 2017-06-01 PROBLEM — I47.1 SVT (SUPRAVENTRICULAR TACHYCARDIA) (HCC): Status: ACTIVE | Noted: 2017-06-01

## 2017-06-01 LAB
ATRIAL RATE: 64 BPM
ATRIAL RATE: 65 BPM
P AXIS: 48 DEGREES
P AXIS: 74 DEGREES
PR INTERVAL: 178 MS
PR INTERVAL: 188 MS
QRS AXIS: -3 DEGREES
QRS AXIS: -5 DEGREES
QRSD INTERVAL: 82 MS
QRSD INTERVAL: 90 MS
QT INTERVAL: 456 MS
QT INTERVAL: 460 MS
QTC INTERVAL: 470 MS
QTC INTERVAL: 478 MS
T WAVE AXIS: 104 DEGREES
T WAVE AXIS: 99 DEGREES
VENTRICULAR RATE: 64 BPM
VENTRICULAR RATE: 65 BPM

## 2017-06-01 PROCEDURE — A9270 NON-COVERED ITEM OR SERVICE: HCPCS | Performed by: HOSPITALIST

## 2017-06-01 PROCEDURE — A9270 NON-COVERED ITEM OR SERVICE: HCPCS | Performed by: INTERNAL MEDICINE

## 2017-06-01 PROCEDURE — A9270 NON-COVERED ITEM OR SERVICE: HCPCS | Performed by: NURSE PRACTITIONER

## 2017-06-01 PROCEDURE — 93005 ELECTROCARDIOGRAM TRACING: CPT

## 2017-06-01 RX ORDER — ONDANSETRON 4 MG/1
4 TABLET, ORALLY DISINTEGRATING ORAL EVERY 6 HOURS PRN
Status: DISCONTINUED | OUTPATIENT
Start: 2017-06-01 | End: 2017-06-04 | Stop reason: HOSPADM

## 2017-06-01 RX ADMIN — OXYCODONE HYDROCHLORIDE 5 MG: 5 TABLET ORAL at 21:40

## 2017-06-01 RX ADMIN — OXYCODONE HYDROCHLORIDE 5 MG: 5 TABLET ORAL at 07:36

## 2017-06-01 RX ADMIN — ESCITALOPRAM OXALATE 20 MG: 20 TABLET ORAL at 09:00

## 2017-06-01 RX ADMIN — ACETAMINOPHEN 975 MG: 325 TABLET, FILM COATED ORAL at 07:36

## 2017-06-01 RX ADMIN — ALPRAZOLAM 0.25 MG: 0.25 TABLET ORAL at 08:59

## 2017-06-01 RX ADMIN — ONDANSETRON 4 MG: 2 INJECTION INTRAMUSCULAR; INTRAVENOUS at 07:35

## 2017-06-01 RX ADMIN — ACETAMINOPHEN 975 MG: 325 TABLET, FILM COATED ORAL at 21:40

## 2017-06-01 RX ADMIN — ACETAMINOPHEN 975 MG: 325 TABLET, FILM COATED ORAL at 16:13

## 2017-06-01 RX ADMIN — OXYCODONE HYDROCHLORIDE 5 MG: 5 TABLET ORAL at 19:20

## 2017-06-01 RX ADMIN — SOTALOL HYDROCHLORIDE 80 MG: 80 TABLET ORAL at 09:00

## 2017-06-01 RX ADMIN — Medication 1 TABLET: at 19:20

## 2017-06-01 RX ADMIN — APIXABAN 5 MG: 5 TABLET, FILM COATED ORAL at 19:20

## 2017-06-01 RX ADMIN — OXYCODONE HYDROCHLORIDE 5 MG: 5 TABLET ORAL at 04:15

## 2017-06-01 RX ADMIN — OXYCODONE HYDROCHLORIDE 5 MG: 5 TABLET ORAL at 11:05

## 2017-06-01 RX ADMIN — Medication 1 TABLET: at 07:35

## 2017-06-01 RX ADMIN — VITAMIN D, TAB 1000IU (100/BT) 1000 UNITS: 25 TAB at 09:00

## 2017-06-01 RX ADMIN — APIXABAN 5 MG: 5 TABLET, FILM COATED ORAL at 08:59

## 2017-06-01 RX ADMIN — ASPIRIN 81 MG: 81 TABLET, CHEWABLE ORAL at 08:59

## 2017-06-01 RX ADMIN — Medication 325 MG: at 07:36

## 2017-06-01 RX ADMIN — SOTALOL HYDROCHLORIDE 80 MG: 80 TABLET ORAL at 21:39

## 2017-06-01 RX ADMIN — Medication 1 TABLET: at 09:00

## 2017-06-01 RX ADMIN — ALPRAZOLAM 0.25 MG: 0.25 TABLET ORAL at 19:20

## 2017-06-02 LAB
ATRIAL RATE: 66 BPM
ATRIAL RATE: 68 BPM
P AXIS: 35 DEGREES
P AXIS: 42 DEGREES
PR INTERVAL: 178 MS
PR INTERVAL: 178 MS
QRS AXIS: -6 DEGREES
QRS AXIS: -9 DEGREES
QRSD INTERVAL: 82 MS
QRSD INTERVAL: 86 MS
QT INTERVAL: 438 MS
QT INTERVAL: 440 MS
QTC INTERVAL: 459 MS
QTC INTERVAL: 467 MS
T WAVE AXIS: 100 DEGREES
T WAVE AXIS: 118 DEGREES
VENTRICULAR RATE: 66 BPM
VENTRICULAR RATE: 68 BPM

## 2017-06-02 PROCEDURE — G8987 SELF CARE CURRENT STATUS: HCPCS

## 2017-06-02 PROCEDURE — A9270 NON-COVERED ITEM OR SERVICE: HCPCS | Performed by: NURSE PRACTITIONER

## 2017-06-02 PROCEDURE — A9270 NON-COVERED ITEM OR SERVICE: HCPCS | Performed by: HOSPITALIST

## 2017-06-02 PROCEDURE — A9270 NON-COVERED ITEM OR SERVICE: HCPCS | Performed by: INTERNAL MEDICINE

## 2017-06-02 PROCEDURE — 97163 PT EVAL HIGH COMPLEX 45 MIN: CPT

## 2017-06-02 PROCEDURE — 93005 ELECTROCARDIOGRAM TRACING: CPT

## 2017-06-02 PROCEDURE — G8978 MOBILITY CURRENT STATUS: HCPCS

## 2017-06-02 PROCEDURE — 97166 OT EVAL MOD COMPLEX 45 MIN: CPT

## 2017-06-02 PROCEDURE — G8988 SELF CARE GOAL STATUS: HCPCS

## 2017-06-02 PROCEDURE — G8979 MOBILITY GOAL STATUS: HCPCS

## 2017-06-02 RX ORDER — POLYETHYLENE GLYCOL 3350 17 G/17G
17 POWDER, FOR SOLUTION ORAL 2 TIMES DAILY
Status: DISCONTINUED | OUTPATIENT
Start: 2017-06-02 | End: 2017-06-04 | Stop reason: HOSPADM

## 2017-06-02 RX ORDER — BISACODYL 10 MG
10 SUPPOSITORY, RECTAL RECTAL DAILY PRN
Status: DISCONTINUED | OUTPATIENT
Start: 2017-06-02 | End: 2017-06-04 | Stop reason: HOSPADM

## 2017-06-02 RX ORDER — ASCORBIC ACID 500 MG
500 TABLET ORAL DAILY
Status: DISCONTINUED | OUTPATIENT
Start: 2017-06-02 | End: 2017-06-04 | Stop reason: HOSPADM

## 2017-06-02 RX ORDER — OXYCODONE HYDROCHLORIDE 5 MG/1
5 TABLET ORAL ONCE
Status: COMPLETED | OUTPATIENT
Start: 2017-06-02 | End: 2017-06-02

## 2017-06-02 RX ORDER — FOLIC ACID 1 MG/1
1 TABLET ORAL DAILY
Status: DISCONTINUED | OUTPATIENT
Start: 2017-06-02 | End: 2017-06-04 | Stop reason: HOSPADM

## 2017-06-02 RX ORDER — OXYCODONE HYDROCHLORIDE 10 MG/1
10 TABLET ORAL
Status: DISCONTINUED | OUTPATIENT
Start: 2017-06-02 | End: 2017-06-04 | Stop reason: HOSPADM

## 2017-06-02 RX ADMIN — Medication 1 TABLET: at 17:09

## 2017-06-02 RX ADMIN — OXYCODONE HYDROCHLORIDE 5 MG: 5 TABLET ORAL at 10:03

## 2017-06-02 RX ADMIN — Medication 1 TABLET: at 08:24

## 2017-06-02 RX ADMIN — ASPIRIN 81 MG: 81 TABLET, CHEWABLE ORAL at 08:24

## 2017-06-02 RX ADMIN — ACETAMINOPHEN 975 MG: 325 TABLET, FILM COATED ORAL at 13:37

## 2017-06-02 RX ADMIN — OXYCODONE HYDROCHLORIDE 5 MG: 5 TABLET ORAL at 09:05

## 2017-06-02 RX ADMIN — ACETAMINOPHEN 975 MG: 325 TABLET, FILM COATED ORAL at 21:51

## 2017-06-02 RX ADMIN — FOLIC ACID 1 MG: 1 TABLET ORAL at 13:38

## 2017-06-02 RX ADMIN — OXYCODONE HYDROCHLORIDE 10 MG: 10 TABLET ORAL at 21:51

## 2017-06-02 RX ADMIN — ESCITALOPRAM OXALATE 20 MG: 20 TABLET ORAL at 08:23

## 2017-06-02 RX ADMIN — POLYETHYLENE GLYCOL 3350 17 G: 17 POWDER, FOR SOLUTION ORAL at 13:38

## 2017-06-02 RX ADMIN — APIXABAN 5 MG: 5 TABLET, FILM COATED ORAL at 08:24

## 2017-06-02 RX ADMIN — SOTALOL HYDROCHLORIDE 80 MG: 80 TABLET ORAL at 09:05

## 2017-06-02 RX ADMIN — BISACODYL 5 MG: 5 TABLET, COATED ORAL at 13:38

## 2017-06-02 RX ADMIN — APIXABAN 5 MG: 5 TABLET, FILM COATED ORAL at 17:10

## 2017-06-02 RX ADMIN — OXYCODONE HYDROCHLORIDE 10 MG: 10 TABLET ORAL at 13:37

## 2017-06-02 RX ADMIN — Medication 325 MG: at 07:43

## 2017-06-02 RX ADMIN — ALPRAZOLAM 0.25 MG: 0.25 TABLET ORAL at 08:23

## 2017-06-02 RX ADMIN — ACETAMINOPHEN 975 MG: 325 TABLET, FILM COATED ORAL at 05:13

## 2017-06-02 RX ADMIN — OXYCODONE HYDROCHLORIDE 5 MG: 5 TABLET ORAL at 05:13

## 2017-06-02 RX ADMIN — VITAMIN D, TAB 1000IU (100/BT) 1000 UNITS: 25 TAB at 08:23

## 2017-06-02 RX ADMIN — SOTALOL HYDROCHLORIDE 80 MG: 80 TABLET ORAL at 21:50

## 2017-06-02 RX ADMIN — OXYCODONE HYDROCHLORIDE AND ACETAMINOPHEN 500 MG: 500 TABLET ORAL at 15:54

## 2017-06-02 RX ADMIN — OXYCODONE HYDROCHLORIDE 10 MG: 10 TABLET ORAL at 17:09

## 2017-06-02 RX ADMIN — Medication 1 TABLET: at 07:43

## 2017-06-02 RX ADMIN — ALPRAZOLAM 0.25 MG: 0.25 TABLET ORAL at 17:09

## 2017-06-03 ENCOUNTER — GENERIC CONVERSION - ENCOUNTER (OUTPATIENT)
Dept: OTHER | Facility: OTHER | Age: 72
End: 2017-06-03

## 2017-06-03 PROBLEM — K59.00 CONSTIPATED: Status: ACTIVE | Noted: 2017-06-03

## 2017-06-03 LAB
ATRIAL RATE: 66 BPM
ATRIAL RATE: 66 BPM
ATRIAL RATE: 73 BPM
P AXIS: 42 DEGREES
P AXIS: 46 DEGREES
P AXIS: 56 DEGREES
PR INTERVAL: 176 MS
PR INTERVAL: 180 MS
PR INTERVAL: 190 MS
QRS AXIS: -1 DEGREES
QRS AXIS: -4 DEGREES
QRS AXIS: -6 DEGREES
QRSD INTERVAL: 78 MS
QRSD INTERVAL: 80 MS
QRSD INTERVAL: 80 MS
QT INTERVAL: 386 MS
QT INTERVAL: 390 MS
QT INTERVAL: 404 MS
QTC INTERVAL: 408 MS
QTC INTERVAL: 423 MS
QTC INTERVAL: 425 MS
T WAVE AXIS: 111 DEGREES
T WAVE AXIS: 111 DEGREES
T WAVE AXIS: 122 DEGREES
VENTRICULAR RATE: 66 BPM
VENTRICULAR RATE: 66 BPM
VENTRICULAR RATE: 73 BPM

## 2017-06-03 PROCEDURE — A9270 NON-COVERED ITEM OR SERVICE: HCPCS | Performed by: NURSE PRACTITIONER

## 2017-06-03 PROCEDURE — A9270 NON-COVERED ITEM OR SERVICE: HCPCS | Performed by: INTERNAL MEDICINE

## 2017-06-03 PROCEDURE — A9270 NON-COVERED ITEM OR SERVICE: HCPCS | Performed by: HOSPITALIST

## 2017-06-03 PROCEDURE — 93005 ELECTROCARDIOGRAM TRACING: CPT | Performed by: HOSPITALIST

## 2017-06-03 PROCEDURE — 93005 ELECTROCARDIOGRAM TRACING: CPT

## 2017-06-03 RX ADMIN — Medication 1 TABLET: at 17:45

## 2017-06-03 RX ADMIN — FOLIC ACID 1 MG: 1 TABLET ORAL at 09:20

## 2017-06-03 RX ADMIN — ALPRAZOLAM 0.25 MG: 0.25 TABLET ORAL at 21:14

## 2017-06-03 RX ADMIN — OXYCODONE HYDROCHLORIDE 10 MG: 10 TABLET ORAL at 10:29

## 2017-06-03 RX ADMIN — BISACODYL 10 MG: 10 SUPPOSITORY RECTAL at 15:27

## 2017-06-03 RX ADMIN — OXYCODONE HYDROCHLORIDE 10 MG: 10 TABLET ORAL at 05:08

## 2017-06-03 RX ADMIN — Medication 1 TABLET: at 09:19

## 2017-06-03 RX ADMIN — ALPRAZOLAM 0.25 MG: 0.25 TABLET ORAL at 17:45

## 2017-06-03 RX ADMIN — OXYCODONE HYDROCHLORIDE 10 MG: 10 TABLET ORAL at 23:16

## 2017-06-03 RX ADMIN — POLYETHYLENE GLYCOL 3350 17 G: 17 POWDER, FOR SOLUTION ORAL at 09:13

## 2017-06-03 RX ADMIN — ASPIRIN 81 MG: 81 TABLET, CHEWABLE ORAL at 09:19

## 2017-06-03 RX ADMIN — APIXABAN 5 MG: 5 TABLET, FILM COATED ORAL at 17:45

## 2017-06-03 RX ADMIN — ALPRAZOLAM 0.25 MG: 0.25 TABLET ORAL at 09:19

## 2017-06-03 RX ADMIN — VITAMIN D, TAB 1000IU (100/BT) 1000 UNITS: 25 TAB at 09:19

## 2017-06-03 RX ADMIN — SOTALOL HYDROCHLORIDE 80 MG: 80 TABLET ORAL at 09:20

## 2017-06-03 RX ADMIN — OXYCODONE HYDROCHLORIDE 10 MG: 10 TABLET ORAL at 15:25

## 2017-06-03 RX ADMIN — APIXABAN 5 MG: 5 TABLET, FILM COATED ORAL at 09:19

## 2017-06-03 RX ADMIN — SOTALOL HYDROCHLORIDE 80 MG: 80 TABLET ORAL at 21:14

## 2017-06-03 RX ADMIN — Medication 325 MG: at 09:20

## 2017-06-03 RX ADMIN — OXYCODONE HYDROCHLORIDE AND ACETAMINOPHEN 500 MG: 500 TABLET ORAL at 09:19

## 2017-06-03 RX ADMIN — ACETAMINOPHEN 975 MG: 325 TABLET, FILM COATED ORAL at 05:07

## 2017-06-03 RX ADMIN — Medication 1 TABLET: at 09:20

## 2017-06-03 RX ADMIN — ACETAMINOPHEN 975 MG: 325 TABLET, FILM COATED ORAL at 15:25

## 2017-06-03 RX ADMIN — ACETAMINOPHEN 975 MG: 325 TABLET, FILM COATED ORAL at 23:16

## 2017-06-03 RX ADMIN — OXYCODONE HYDROCHLORIDE 10 MG: 10 TABLET ORAL at 19:17

## 2017-06-03 RX ADMIN — ESCITALOPRAM OXALATE 20 MG: 20 TABLET ORAL at 09:19

## 2017-06-04 VITALS
BODY MASS INDEX: 30.17 KG/M2 | RESPIRATION RATE: 18 BRPM | DIASTOLIC BLOOD PRESSURE: 52 MMHG | OXYGEN SATURATION: 91 % | HEIGHT: 70 IN | HEART RATE: 65 BPM | WEIGHT: 210.76 LBS | TEMPERATURE: 98 F | SYSTOLIC BLOOD PRESSURE: 92 MMHG

## 2017-06-04 PROBLEM — I47.1 SVT (SUPRAVENTRICULAR TACHYCARDIA) (HCC): Status: RESOLVED | Noted: 2017-06-01 | Resolved: 2017-06-04

## 2017-06-04 PROBLEM — K59.00 CONSTIPATED: Status: RESOLVED | Noted: 2017-06-03 | Resolved: 2017-06-04

## 2017-06-04 PROCEDURE — A9270 NON-COVERED ITEM OR SERVICE: HCPCS | Performed by: HOSPITALIST

## 2017-06-04 PROCEDURE — A9270 NON-COVERED ITEM OR SERVICE: HCPCS | Performed by: NURSE PRACTITIONER

## 2017-06-04 PROCEDURE — A9270 NON-COVERED ITEM OR SERVICE: HCPCS | Performed by: INTERNAL MEDICINE

## 2017-06-04 RX ORDER — OXYCODONE HYDROCHLORIDE 5 MG/1
5 TABLET ORAL EVERY 4 HOURS PRN
Qty: 30 TABLET | Refills: 0 | Status: SHIPPED | OUTPATIENT
Start: 2017-06-04 | End: 2017-06-14

## 2017-06-04 RX ORDER — BISACODYL 10 MG
10 SUPPOSITORY, RECTAL RECTAL DAILY PRN
Qty: 12 SUPPOSITORY | Refills: 0 | Status: SHIPPED | OUTPATIENT
Start: 2017-06-04 | End: 2017-06-29 | Stop reason: HOSPADM

## 2017-06-04 RX ORDER — SOTALOL HYDROCHLORIDE 80 MG/1
80 TABLET ORAL EVERY 12 HOURS SCHEDULED
Qty: 60 TABLET | Refills: 0 | Status: SHIPPED | OUTPATIENT
Start: 2017-06-04

## 2017-06-04 RX ORDER — FOLIC ACID 1 MG/1
1 TABLET ORAL DAILY
Qty: 30 TABLET | Refills: 0
Start: 2017-06-04

## 2017-06-04 RX ORDER — ASCORBIC ACID 500 MG
500 TABLET ORAL DAILY
Qty: 30 TABLET | Refills: 0
Start: 2017-06-04

## 2017-06-04 RX ORDER — ALPRAZOLAM 0.25 MG/1
0.25 TABLET ORAL
Qty: 30 TABLET | Refills: 0 | Status: SHIPPED | OUTPATIENT
Start: 2017-06-04 | End: 2017-06-29 | Stop reason: HOSPADM

## 2017-06-04 RX ORDER — AMOXICILLIN 250 MG
1 CAPSULE ORAL 2 TIMES DAILY
Qty: 30 TABLET | Refills: 0
Start: 2017-06-04 | End: 2017-06-29 | Stop reason: HOSPADM

## 2017-06-04 RX ORDER — POLYETHYLENE GLYCOL 3350 17 G/17G
17 POWDER, FOR SOLUTION ORAL 2 TIMES DAILY
Qty: 14 EACH | Refills: 0 | Status: SHIPPED | OUTPATIENT
Start: 2017-06-04 | End: 2017-06-29 | Stop reason: HOSPADM

## 2017-06-04 RX ORDER — ALPRAZOLAM 0.25 MG/1
0.25 TABLET ORAL 2 TIMES DAILY
Qty: 20 TABLET | Refills: 0 | Status: SHIPPED | OUTPATIENT
Start: 2017-06-04 | End: 2017-06-29 | Stop reason: HOSPADM

## 2017-06-04 RX ORDER — ACETAMINOPHEN 325 MG/1
650 TABLET ORAL EVERY 4 HOURS PRN
Qty: 30 TABLET | Refills: 0
Start: 2017-06-04 | End: 2017-06-29 | Stop reason: HOSPADM

## 2017-06-04 RX ADMIN — VITAMIN D, TAB 1000IU (100/BT) 1000 UNITS: 25 TAB at 09:44

## 2017-06-04 RX ADMIN — FOLIC ACID 1 MG: 1 TABLET ORAL at 09:44

## 2017-06-04 RX ADMIN — ACETAMINOPHEN 975 MG: 325 TABLET, FILM COATED ORAL at 05:54

## 2017-06-04 RX ADMIN — ESCITALOPRAM OXALATE 20 MG: 20 TABLET ORAL at 09:46

## 2017-06-04 RX ADMIN — ASPIRIN 81 MG: 81 TABLET, CHEWABLE ORAL at 09:44

## 2017-06-04 RX ADMIN — Medication 1 TABLET: at 09:46

## 2017-06-04 RX ADMIN — OXYCODONE HYDROCHLORIDE 10 MG: 10 TABLET ORAL at 05:54

## 2017-06-04 RX ADMIN — Medication 1 TABLET: at 09:44

## 2017-06-04 RX ADMIN — OXYCODONE HYDROCHLORIDE AND ACETAMINOPHEN 500 MG: 500 TABLET ORAL at 09:47

## 2017-06-04 RX ADMIN — APIXABAN 5 MG: 5 TABLET, FILM COATED ORAL at 09:44

## 2017-06-04 RX ADMIN — OXYCODONE HYDROCHLORIDE 10 MG: 10 TABLET ORAL at 13:48

## 2017-06-04 RX ADMIN — ACETAMINOPHEN 975 MG: 325 TABLET, FILM COATED ORAL at 13:48

## 2017-06-04 RX ADMIN — SOTALOL HYDROCHLORIDE 80 MG: 80 TABLET ORAL at 10:19

## 2017-06-04 RX ADMIN — ALPRAZOLAM 0.25 MG: 0.25 TABLET ORAL at 09:44

## 2017-06-04 RX ADMIN — Medication 325 MG: at 09:44

## 2017-06-05 ENCOUNTER — GENERIC CONVERSION - ENCOUNTER (OUTPATIENT)
Dept: OTHER | Facility: OTHER | Age: 72
End: 2017-06-05

## 2017-06-07 ENCOUNTER — GENERIC CONVERSION - ENCOUNTER (OUTPATIENT)
Dept: OTHER | Facility: OTHER | Age: 72
End: 2017-06-07

## 2017-06-14 ENCOUNTER — APPOINTMENT (OUTPATIENT)
Dept: PREADMISSION TESTING | Facility: HOSPITAL | Age: 72
End: 2017-06-14
Payer: COMMERCIAL

## 2017-06-14 ENCOUNTER — GENERIC CONVERSION - ENCOUNTER (OUTPATIENT)
Dept: OTHER | Facility: OTHER | Age: 72
End: 2017-06-14

## 2017-06-14 ENCOUNTER — TRANSCRIBE ORDERS (OUTPATIENT)
Dept: LAB | Facility: HOSPITAL | Age: 72
End: 2017-06-14

## 2017-06-14 ENCOUNTER — HOSPITAL ENCOUNTER (OUTPATIENT)
Dept: RADIOLOGY | Facility: HOSPITAL | Age: 72
Discharge: HOME | End: 2017-06-14
Attending: ORTHOPAEDIC SURGERY
Payer: COMMERCIAL

## 2017-06-14 DIAGNOSIS — I10 ESSENTIAL (PRIMARY) HYPERTENSION: ICD-10-CM

## 2017-06-14 DIAGNOSIS — M16.11 PRIMARY OSTEOARTHRITIS OF RIGHT HIP: ICD-10-CM

## 2017-06-14 DIAGNOSIS — I48.91 ATRIAL FIBRILLATION (HCC): ICD-10-CM

## 2017-06-14 DIAGNOSIS — Z01.818 PREOP EXAMINATION: Primary | ICD-10-CM

## 2017-06-14 DIAGNOSIS — Z01.818 PREOP EXAMINATION: ICD-10-CM

## 2017-06-14 DIAGNOSIS — I82.409 ACUTE EMBOLISM AND THROMBOSIS OF DEEP VEIN OF LOWER EXTREMITY (HCC): ICD-10-CM

## 2017-06-14 LAB
ABO GROUP BLD: NORMAL
ALBUMIN SERPL BCP-MCNC: 2.3 G/DL (ref 3.5–5)
ALP SERPL-CCNC: 558 U/L (ref 46–116)
ALT SERPL W P-5'-P-CCNC: 20 U/L (ref 12–78)
ANION GAP SERPL CALCULATED.3IONS-SCNC: 9 MMOL/L (ref 4–13)
ANISOCYTOSIS BLD QL SMEAR: PRESENT
APTT PPP: 34 SECONDS (ref 23–35)
AST SERPL W P-5'-P-CCNC: 58 U/L (ref 5–45)
ATRIAL RATE: 79 BPM
BASOPHILS # BLD MANUAL: 0 THOUSAND/UL (ref 0–0.1)
BASOPHILS NFR MAR MANUAL: 0 % (ref 0–1)
BILIRUB SERPL-MCNC: 1.4 MG/DL (ref 0.2–1)
BLD GP AB SCN SERPL QL: NEGATIVE
BUN SERPL-MCNC: 21 MG/DL (ref 5–25)
CALCIUM SERPL-MCNC: 8.9 MG/DL (ref 8.3–10.1)
CHLORIDE SERPL-SCNC: 97 MMOL/L (ref 100–108)
CO2 SERPL-SCNC: 27 MMOL/L (ref 21–32)
CREAT SERPL-MCNC: 0.74 MG/DL (ref 0.6–1.3)
EOSINOPHIL # BLD MANUAL: 0 THOUSAND/UL (ref 0–0.4)
EOSINOPHIL NFR BLD MANUAL: 0 % (ref 0–6)
ERYTHROCYTE [DISTWIDTH] IN BLOOD BY AUTOMATED COUNT: 17.1 % (ref 11.6–15.1)
EST. AVERAGE GLUCOSE BLD GHB EST-MCNC: 117 MG/DL
GFR SERPL CREATININE-BSD FRML MDRD: >60 ML/MIN/1.73SQ M
GLUCOSE SERPL-MCNC: 124 MG/DL (ref 65–140)
HBA1C MFR BLD: 5.7 % (ref 4.2–6.3)
HCT VFR BLD AUTO: 28.8 % (ref 34.8–46.1)
HGB BLD-MCNC: 9.4 G/DL (ref 11.5–15.4)
INR PPP: 1.73 (ref 0.86–1.16)
LYMPHOCYTES # BLD AUTO: 0.31 THOUSAND/UL (ref 0.6–4.47)
LYMPHOCYTES # BLD AUTO: 2 % (ref 14–44)
MCH RBC QN AUTO: 31.1 PG (ref 26.8–34.3)
MCHC RBC AUTO-ENTMCNC: 32.6 G/DL (ref 31.4–37.4)
MCV RBC AUTO: 95 FL (ref 82–98)
MONOCYTES # BLD AUTO: 0.76 THOUSAND/UL (ref 0–1.22)
MONOCYTES NFR BLD: 5 % (ref 4–12)
NEUTROPHILS # BLD MANUAL: 14.18 THOUSAND/UL (ref 1.85–7.62)
NEUTS BAND NFR BLD MANUAL: 2 % (ref 0–8)
NEUTS SEG NFR BLD AUTO: 91 % (ref 43–75)
NRBC BLD AUTO-RTO: 0 /100 WBCS
P AXIS: 75 DEGREES
PLATELET # BLD AUTO: 378 THOUSANDS/UL (ref 149–390)
PLATELET BLD QL SMEAR: ADEQUATE
PMV BLD AUTO: 9.4 FL (ref 8.9–12.7)
POTASSIUM SERPL-SCNC: 4.2 MMOL/L (ref 3.5–5.3)
PR INTERVAL: 164 MS
PROT SERPL-MCNC: 6.5 G/DL (ref 6.4–8.2)
PROTHROMBIN TIME: 20.4 SECONDS (ref 12.1–14.4)
QRS AXIS: 9 DEGREES
QRSD INTERVAL: 82 MS
QT INTERVAL: 406 MS
QTC INTERVAL: 465 MS
RBC # BLD AUTO: 3.02 MILLION/UL (ref 3.81–5.12)
RBC MORPH BLD: PRESENT
RH BLD: POSITIVE
SODIUM SERPL-SCNC: 133 MMOL/L (ref 136–145)
SPECIMEN EXPIRATION DATE: NORMAL
T WAVE AXIS: 137 DEGREES
VENTRICULAR RATE: 79 BPM
WBC # BLD AUTO: 15.25 THOUSAND/UL (ref 4.31–10.16)

## 2017-06-14 PROCEDURE — 36415 COLL VENOUS BLD VENIPUNCTURE: CPT

## 2017-06-14 PROCEDURE — 86900 BLOOD TYPING SEROLOGIC ABO: CPT

## 2017-06-14 PROCEDURE — 93005 ELECTROCARDIOGRAM TRACING: CPT

## 2017-06-14 PROCEDURE — 83036 HEMOGLOBIN GLYCOSYLATED A1C: CPT

## 2017-06-14 PROCEDURE — 85610 PROTHROMBIN TIME: CPT

## 2017-06-14 PROCEDURE — 86901 BLOOD TYPING SEROLOGIC RH(D): CPT

## 2017-06-14 PROCEDURE — 86850 RBC ANTIBODY SCREEN: CPT

## 2017-06-14 PROCEDURE — 85730 THROMBOPLASTIN TIME PARTIAL: CPT

## 2017-06-14 PROCEDURE — 85027 COMPLETE CBC AUTOMATED: CPT

## 2017-06-14 PROCEDURE — 80053 COMPREHEN METABOLIC PANEL: CPT

## 2017-06-14 PROCEDURE — 85007 BL SMEAR W/DIFF WBC COUNT: CPT

## 2017-06-15 ENCOUNTER — APPOINTMENT (OUTPATIENT)
Dept: LAB | Facility: HOSPITAL | Age: 72
End: 2017-06-15
Attending: ORTHOPAEDIC SURGERY
Payer: COMMERCIAL

## 2017-06-15 DIAGNOSIS — M16.11 PRIMARY OSTEOARTHRITIS OF RIGHT HIP: ICD-10-CM

## 2017-06-15 LAB
AMORPH URATE CRY URNS QL MICRO: ABNORMAL /HPF
BACTERIA UR QL AUTO: ABNORMAL /HPF
BILIRUB UR QL STRIP: ABNORMAL
CLARITY UR: ABNORMAL
COLOR UR: ABNORMAL
GLUCOSE UR STRIP-MCNC: NEGATIVE MG/DL
HGB UR QL STRIP.AUTO: ABNORMAL
KETONES UR STRIP-MCNC: ABNORMAL MG/DL
LEUKOCYTE ESTERASE UR QL STRIP: ABNORMAL
NITRITE UR QL STRIP: NEGATIVE
NON-SQ EPI CELLS URNS QL MICRO: ABNORMAL /HPF
PH UR STRIP.AUTO: 5.5 [PH] (ref 4.5–8)
PROT UR STRIP-MCNC: ABNORMAL MG/DL
RBC #/AREA URNS AUTO: ABNORMAL /HPF
SP GR UR STRIP.AUTO: 1.02 (ref 1–1.03)
UROBILINOGEN UR QL STRIP.AUTO: 0.2 E.U./DL
WBC #/AREA URNS AUTO: ABNORMAL /HPF

## 2017-06-15 PROCEDURE — 81001 URINALYSIS AUTO W/SCOPE: CPT

## 2017-06-16 ENCOUNTER — TRANSCRIBE ORDERS (OUTPATIENT)
Dept: ADMINISTRATIVE | Facility: HOSPITAL | Age: 72
End: 2017-06-16

## 2017-06-16 DIAGNOSIS — M54.5 LOW BACK PAIN, UNSPECIFIED BACK PAIN LATERALITY, UNSPECIFIED CHRONICITY, WITH SCIATICA PRESENCE UNSPECIFIED: Primary | ICD-10-CM

## 2017-06-23 ENCOUNTER — APPOINTMENT (EMERGENCY)
Dept: RADIOLOGY | Facility: HOSPITAL | Age: 72
DRG: 543 | End: 2017-06-23
Payer: COMMERCIAL

## 2017-06-23 ENCOUNTER — GENERIC CONVERSION - ENCOUNTER (OUTPATIENT)
Dept: OTHER | Facility: OTHER | Age: 72
End: 2017-06-23

## 2017-06-23 ENCOUNTER — HOSPITAL ENCOUNTER (INPATIENT)
Facility: HOSPITAL | Age: 72
LOS: 6 days | Discharge: NURSING, REHAB OR LONG-TERM CARE | DRG: 543 | End: 2017-06-29
Attending: EMERGENCY MEDICINE | Admitting: HOSPITALIST
Payer: COMMERCIAL

## 2017-06-23 DIAGNOSIS — R93.7 ABNORMAL MRI, SPINE: Primary | ICD-10-CM

## 2017-06-23 DIAGNOSIS — G95.20 CORD COMPRESSION (HCC): ICD-10-CM

## 2017-06-23 DIAGNOSIS — C79.9 METASTATIC CANCER (HCC): ICD-10-CM

## 2017-06-23 PROBLEM — G83.4 CAUDA EQUINA SYNDROME (HCC): Status: ACTIVE | Noted: 2017-06-23

## 2017-06-23 LAB
ANION GAP SERPL CALCULATED.3IONS-SCNC: 8 MMOL/L (ref 4–13)
ANISOCYTOSIS BLD QL SMEAR: PRESENT
APTT PPP: 35 SECONDS (ref 23–35)
BASOPHILS # BLD MANUAL: 0 THOUSAND/UL (ref 0–0.1)
BASOPHILS NFR MAR MANUAL: 0 % (ref 0–1)
BUN SERPL-MCNC: 17 MG/DL (ref 5–25)
CALCIUM SERPL-MCNC: 8.5 MG/DL (ref 8.3–10.1)
CHLORIDE SERPL-SCNC: 98 MMOL/L (ref 100–108)
CO2 SERPL-SCNC: 29 MMOL/L (ref 21–32)
CREAT SERPL-MCNC: 0.6 MG/DL (ref 0.6–1.3)
EOSINOPHIL # BLD MANUAL: 0.11 THOUSAND/UL (ref 0–0.4)
EOSINOPHIL NFR BLD MANUAL: 1 % (ref 0–6)
ERYTHROCYTE [DISTWIDTH] IN BLOOD BY AUTOMATED COUNT: 17.4 % (ref 11.6–15.1)
GFR SERPL CREATININE-BSD FRML MDRD: >60 ML/MIN/1.73SQ M
GLUCOSE SERPL-MCNC: 96 MG/DL (ref 65–140)
HCT VFR BLD AUTO: 26.4 % (ref 34.8–46.1)
HGB BLD-MCNC: 8.5 G/DL (ref 11.5–15.4)
INR PPP: 1.51 (ref 0.86–1.16)
LYMPHOCYTES # BLD AUTO: 0.11 THOUSAND/UL (ref 0.6–4.47)
LYMPHOCYTES # BLD AUTO: 1 % (ref 14–44)
MCH RBC QN AUTO: 30.7 PG (ref 26.8–34.3)
MCHC RBC AUTO-ENTMCNC: 32.2 G/DL (ref 31.4–37.4)
MCV RBC AUTO: 95 FL (ref 82–98)
METAMYELOCYTES NFR BLD MANUAL: 1 % (ref 0–1)
MONOCYTES # BLD AUTO: 0.66 THOUSAND/UL (ref 0–1.22)
MONOCYTES NFR BLD: 6 % (ref 4–12)
NEUTROPHILS # BLD MANUAL: 10.05 THOUSAND/UL (ref 1.85–7.62)
NEUTS SEG NFR BLD AUTO: 91 % (ref 43–75)
NRBC BLD AUTO-RTO: 0 /100 WBCS
PLATELET # BLD AUTO: 341 THOUSANDS/UL (ref 149–390)
PLATELET BLD QL SMEAR: ADEQUATE
PMV BLD AUTO: 9.4 FL (ref 8.9–12.7)
POLYCHROMASIA BLD QL SMEAR: PRESENT
POTASSIUM SERPL-SCNC: 4.3 MMOL/L (ref 3.5–5.3)
PROTHROMBIN TIME: 18.3 SECONDS (ref 12.1–14.4)
RBC # BLD AUTO: 2.77 MILLION/UL (ref 3.81–5.12)
RBC MORPH BLD: PRESENT
SODIUM SERPL-SCNC: 135 MMOL/L (ref 136–145)
WBC # BLD AUTO: 11.04 THOUSAND/UL (ref 4.31–10.16)

## 2017-06-23 PROCEDURE — 85007 BL SMEAR W/DIFF WBC COUNT: CPT | Performed by: EMERGENCY MEDICINE

## 2017-06-23 PROCEDURE — 96361 HYDRATE IV INFUSION ADD-ON: CPT

## 2017-06-23 PROCEDURE — 80048 BASIC METABOLIC PNL TOTAL CA: CPT | Performed by: EMERGENCY MEDICINE

## 2017-06-23 PROCEDURE — A9585 GADOBUTROL INJECTION: HCPCS | Performed by: EMERGENCY MEDICINE

## 2017-06-23 PROCEDURE — 36415 COLL VENOUS BLD VENIPUNCTURE: CPT | Performed by: EMERGENCY MEDICINE

## 2017-06-23 PROCEDURE — 85730 THROMBOPLASTIN TIME PARTIAL: CPT | Performed by: EMERGENCY MEDICINE

## 2017-06-23 PROCEDURE — 99285 EMERGENCY DEPT VISIT HI MDM: CPT

## 2017-06-23 PROCEDURE — 72148 MRI LUMBAR SPINE W/O DYE: CPT

## 2017-06-23 PROCEDURE — 85610 PROTHROMBIN TIME: CPT | Performed by: EMERGENCY MEDICINE

## 2017-06-23 PROCEDURE — 85027 COMPLETE CBC AUTOMATED: CPT | Performed by: EMERGENCY MEDICINE

## 2017-06-23 PROCEDURE — 96374 THER/PROPH/DIAG INJ IV PUSH: CPT

## 2017-06-23 PROCEDURE — 96375 TX/PRO/DX INJ NEW DRUG ADDON: CPT

## 2017-06-23 RX ORDER — ACETAMINOPHEN 325 MG/1
650 TABLET ORAL EVERY 4 HOURS PRN
Status: DISCONTINUED | OUTPATIENT
Start: 2017-06-23 | End: 2017-06-27

## 2017-06-23 RX ORDER — OXYCODONE HYDROCHLORIDE 5 MG/1
5 CAPSULE ORAL EVERY 4 HOURS PRN
COMMUNITY
End: 2017-06-29 | Stop reason: HOSPADM

## 2017-06-23 RX ORDER — AMOXICILLIN 250 MG
1 CAPSULE ORAL 2 TIMES DAILY PRN
Status: DISCONTINUED | OUTPATIENT
Start: 2017-06-23 | End: 2017-06-27

## 2017-06-23 RX ORDER — ACETAMINOPHEN 325 MG/1
650 TABLET ORAL ONCE
Status: COMPLETED | OUTPATIENT
Start: 2017-06-23 | End: 2017-06-23

## 2017-06-23 RX ORDER — ALPRAZOLAM 0.25 MG/1
0.25 TABLET ORAL 2 TIMES DAILY
Status: DISCONTINUED | OUTPATIENT
Start: 2017-06-23 | End: 2017-06-26

## 2017-06-23 RX ORDER — ALPRAZOLAM 0.25 MG/1
0.25 TABLET ORAL
Status: DISCONTINUED | OUTPATIENT
Start: 2017-06-23 | End: 2017-06-27

## 2017-06-23 RX ORDER — FOLIC ACID 1 MG/1
1 TABLET ORAL DAILY
Status: DISCONTINUED | OUTPATIENT
Start: 2017-06-24 | End: 2017-06-29 | Stop reason: HOSPADM

## 2017-06-23 RX ORDER — FERROUS SULFATE 325(65) MG
325 TABLET ORAL
Status: DISCONTINUED | OUTPATIENT
Start: 2017-06-24 | End: 2017-06-23

## 2017-06-23 RX ORDER — LIDOCAINE 50 MG/G
1 PATCH TOPICAL ONCE
Status: COMPLETED | OUTPATIENT
Start: 2017-06-23 | End: 2017-06-24

## 2017-06-23 RX ORDER — ALUMINUM HYDROXIDE, MAGNESIUM HYDROXIDE, SIMETHICONE 400; 400; 40 MG/10ML; MG/10ML; MG/10ML
30 SUSPENSION ORAL
COMMUNITY

## 2017-06-23 RX ORDER — KETOROLAC TROMETHAMINE 30 MG/ML
15 INJECTION, SOLUTION INTRAMUSCULAR; INTRAVENOUS ONCE
Status: COMPLETED | OUTPATIENT
Start: 2017-06-23 | End: 2017-06-23

## 2017-06-23 RX ORDER — OXYCODONE HYDROCHLORIDE 5 MG/1
5 TABLET ORAL EVERY 4 HOURS PRN
Status: DISCONTINUED | OUTPATIENT
Start: 2017-06-23 | End: 2017-06-29 | Stop reason: HOSPADM

## 2017-06-23 RX ORDER — AMOXICILLIN 250 MG
1 CAPSULE ORAL 2 TIMES DAILY
Status: DISCONTINUED | OUTPATIENT
Start: 2017-06-23 | End: 2017-06-23

## 2017-06-23 RX ORDER — PANTOPRAZOLE SODIUM 40 MG/1
40 INJECTION, POWDER, FOR SOLUTION INTRAVENOUS
Status: DISCONTINUED | OUTPATIENT
Start: 2017-06-24 | End: 2017-06-26

## 2017-06-23 RX ORDER — FERROUS SULFATE 325(65) MG
325 TABLET ORAL
Status: DISCONTINUED | OUTPATIENT
Start: 2017-06-24 | End: 2017-06-29 | Stop reason: HOSPADM

## 2017-06-23 RX ORDER — ESCITALOPRAM OXALATE 20 MG/1
20 TABLET ORAL DAILY
Status: DISCONTINUED | OUTPATIENT
Start: 2017-06-24 | End: 2017-06-29 | Stop reason: HOSPADM

## 2017-06-23 RX ORDER — ASCORBIC ACID 500 MG
500 TABLET ORAL DAILY
Status: DISCONTINUED | OUTPATIENT
Start: 2017-06-24 | End: 2017-06-29 | Stop reason: HOSPADM

## 2017-06-23 RX ORDER — SOTALOL HYDROCHLORIDE 80 MG/1
80 TABLET ORAL EVERY 12 HOURS SCHEDULED
Status: DISCONTINUED | OUTPATIENT
Start: 2017-06-23 | End: 2017-06-29 | Stop reason: HOSPADM

## 2017-06-23 RX ADMIN — HYDROMORPHONE HYDROCHLORIDE 0.5 MG: 1 INJECTION, SOLUTION INTRAMUSCULAR; INTRAVENOUS; SUBCUTANEOUS at 13:49

## 2017-06-23 RX ADMIN — LIDOCAINE 1 PATCH: 50 PATCH CUTANEOUS at 16:20

## 2017-06-23 RX ADMIN — KETOROLAC TROMETHAMINE 15 MG: 30 INJECTION, SOLUTION INTRAMUSCULAR at 13:50

## 2017-06-23 RX ADMIN — OXYCODONE HYDROCHLORIDE 5 MG: 5 TABLET ORAL at 20:34

## 2017-06-23 RX ADMIN — DOCUSATE SODIUM AND SENNOSIDES 1 TABLET: 8.6; 5 TABLET, FILM COATED ORAL at 20:33

## 2017-06-23 RX ADMIN — SOTALOL HYDROCHLORIDE 80 MG: 80 TABLET ORAL at 20:36

## 2017-06-23 RX ADMIN — DEXAMETHASONE SODIUM PHOSPHATE 10 MG: 10 INJECTION, SOLUTION INTRAMUSCULAR; INTRAVENOUS at 16:01

## 2017-06-23 RX ADMIN — SODIUM CHLORIDE 1000 ML: 0.9 INJECTION, SOLUTION INTRAVENOUS at 13:48

## 2017-06-23 RX ADMIN — ALPRAZOLAM 0.25 MG: 0.25 TABLET ORAL at 20:32

## 2017-06-23 RX ADMIN — ACETAMINOPHEN 650 MG: 325 TABLET, FILM COATED ORAL at 13:50

## 2017-06-23 RX ADMIN — ACETAMINOPHEN 650 MG: 325 TABLET, FILM COATED ORAL at 20:31

## 2017-06-23 RX ADMIN — GADOBUTROL 9.3 ML: 604.72 INJECTION INTRAVENOUS at 15:14

## 2017-06-23 RX ADMIN — DEXAMETHASONE SODIUM PHOSPHATE 10 MG: 10 INJECTION, SOLUTION INTRAMUSCULAR; INTRAVENOUS at 21:18

## 2017-06-24 LAB
ALBUMIN SERPL BCP-MCNC: 2 G/DL (ref 3.5–5)
ALP SERPL-CCNC: 517 U/L (ref 46–116)
ALT SERPL W P-5'-P-CCNC: 15 U/L (ref 12–78)
ANION GAP SERPL CALCULATED.3IONS-SCNC: 9 MMOL/L (ref 4–13)
AST SERPL W P-5'-P-CCNC: 50 U/L (ref 5–45)
BILIRUB SERPL-MCNC: 0.7 MG/DL (ref 0.2–1)
BUN SERPL-MCNC: 18 MG/DL (ref 5–25)
CALCIUM SERPL-MCNC: 8.6 MG/DL (ref 8.3–10.1)
CHLORIDE SERPL-SCNC: 101 MMOL/L (ref 100–108)
CO2 SERPL-SCNC: 27 MMOL/L (ref 21–32)
CREAT SERPL-MCNC: 0.54 MG/DL (ref 0.6–1.3)
ERYTHROCYTE [DISTWIDTH] IN BLOOD BY AUTOMATED COUNT: 17.4 % (ref 11.6–15.1)
GFR SERPL CREATININE-BSD FRML MDRD: >60 ML/MIN/1.73SQ M
GLUCOSE SERPL-MCNC: 133 MG/DL (ref 65–140)
HCT VFR BLD AUTO: 26.8 % (ref 34.8–46.1)
HGB BLD-MCNC: 8.4 G/DL (ref 11.5–15.4)
MAGNESIUM SERPL-MCNC: 2.2 MG/DL (ref 1.6–2.6)
MCH RBC QN AUTO: 29.9 PG (ref 26.8–34.3)
MCHC RBC AUTO-ENTMCNC: 31.3 G/DL (ref 31.4–37.4)
MCV RBC AUTO: 95 FL (ref 82–98)
PHOSPHATE SERPL-MCNC: 3.6 MG/DL (ref 2.3–4.1)
PLATELET # BLD AUTO: 322 THOUSANDS/UL (ref 149–390)
PMV BLD AUTO: 9.4 FL (ref 8.9–12.7)
POTASSIUM SERPL-SCNC: 4.3 MMOL/L (ref 3.5–5.3)
PROT SERPL-MCNC: 5.9 G/DL (ref 6.4–8.2)
RBC # BLD AUTO: 2.81 MILLION/UL (ref 3.81–5.12)
SODIUM SERPL-SCNC: 137 MMOL/L (ref 136–145)
WBC # BLD AUTO: 6.18 THOUSAND/UL (ref 4.31–10.16)

## 2017-06-24 PROCEDURE — 80053 COMPREHEN METABOLIC PANEL: CPT | Performed by: HOSPITALIST

## 2017-06-24 PROCEDURE — 84100 ASSAY OF PHOSPHORUS: CPT | Performed by: HOSPITALIST

## 2017-06-24 PROCEDURE — 85027 COMPLETE CBC AUTOMATED: CPT | Performed by: HOSPITALIST

## 2017-06-24 PROCEDURE — 83735 ASSAY OF MAGNESIUM: CPT | Performed by: HOSPITALIST

## 2017-06-24 PROCEDURE — C9113 INJ PANTOPRAZOLE SODIUM, VIA: HCPCS | Performed by: HOSPITALIST

## 2017-06-24 RX ADMIN — SOTALOL HYDROCHLORIDE 80 MG: 80 TABLET ORAL at 21:20

## 2017-06-24 RX ADMIN — DEXAMETHASONE SODIUM PHOSPHATE 10 MG: 10 INJECTION, SOLUTION INTRAMUSCULAR; INTRAVENOUS at 17:31

## 2017-06-24 RX ADMIN — ACETAMINOPHEN 650 MG: 325 TABLET, FILM COATED ORAL at 11:50

## 2017-06-24 RX ADMIN — HYDROMORPHONE HYDROCHLORIDE 0.5 MG: 1 INJECTION, SOLUTION INTRAMUSCULAR; INTRAVENOUS; SUBCUTANEOUS at 14:26

## 2017-06-24 RX ADMIN — Medication 325 MG: at 07:47

## 2017-06-24 RX ADMIN — Medication 500 MG: at 08:49

## 2017-06-24 RX ADMIN — OXYCODONE HYDROCHLORIDE 5 MG: 5 TABLET ORAL at 07:47

## 2017-06-24 RX ADMIN — ALPRAZOLAM 0.25 MG: 0.25 TABLET ORAL at 08:49

## 2017-06-24 RX ADMIN — DEXAMETHASONE SODIUM PHOSPHATE 10 MG: 10 INJECTION, SOLUTION INTRAMUSCULAR; INTRAVENOUS at 21:19

## 2017-06-24 RX ADMIN — FOLIC ACID 1 MG: 1 TABLET ORAL at 08:49

## 2017-06-24 RX ADMIN — PANTOPRAZOLE SODIUM 40 MG: 40 INJECTION, POWDER, FOR SOLUTION INTRAVENOUS at 08:49

## 2017-06-24 RX ADMIN — DEXAMETHASONE SODIUM PHOSPHATE 10 MG: 10 INJECTION, SOLUTION INTRAMUSCULAR; INTRAVENOUS at 09:00

## 2017-06-24 RX ADMIN — ACETAMINOPHEN 650 MG: 325 TABLET, FILM COATED ORAL at 21:28

## 2017-06-24 RX ADMIN — SOTALOL HYDROCHLORIDE 80 MG: 80 TABLET ORAL at 08:50

## 2017-06-24 RX ADMIN — ALPRAZOLAM 0.25 MG: 0.25 TABLET ORAL at 17:31

## 2017-06-24 RX ADMIN — OXYCODONE HYDROCHLORIDE 5 MG: 5 TABLET ORAL at 14:25

## 2017-06-24 RX ADMIN — ESCITALOPRAM OXALATE 20 MG: 20 TABLET ORAL at 08:49

## 2017-06-24 RX ADMIN — ALPRAZOLAM 0.25 MG: 0.25 TABLET ORAL at 14:26

## 2017-06-24 RX ADMIN — Medication 1 TABLET: at 14:30

## 2017-06-24 RX ADMIN — HYDROMORPHONE HYDROCHLORIDE 0.5 MG: 1 INJECTION, SOLUTION INTRAMUSCULAR; INTRAVENOUS; SUBCUTANEOUS at 07:47

## 2017-06-24 RX ADMIN — DEXAMETHASONE SODIUM PHOSPHATE 10 MG: 10 INJECTION, SOLUTION INTRAMUSCULAR; INTRAVENOUS at 05:39

## 2017-06-25 PROCEDURE — C9113 INJ PANTOPRAZOLE SODIUM, VIA: HCPCS | Performed by: HOSPITALIST

## 2017-06-25 RX ORDER — ONDANSETRON 2 MG/ML
4 INJECTION INTRAMUSCULAR; INTRAVENOUS EVERY 6 HOURS PRN
Status: DISCONTINUED | OUTPATIENT
Start: 2017-06-25 | End: 2017-06-29 | Stop reason: HOSPADM

## 2017-06-25 RX ADMIN — ACETAMINOPHEN 650 MG: 325 TABLET, FILM COATED ORAL at 01:57

## 2017-06-25 RX ADMIN — DEXAMETHASONE SODIUM PHOSPHATE 10 MG: 10 INJECTION, SOLUTION INTRAMUSCULAR; INTRAVENOUS at 16:23

## 2017-06-25 RX ADMIN — Medication 325 MG: at 07:54

## 2017-06-25 RX ADMIN — DEXAMETHASONE SODIUM PHOSPHATE 10 MG: 10 INJECTION, SOLUTION INTRAMUSCULAR; INTRAVENOUS at 04:32

## 2017-06-25 RX ADMIN — OXYCODONE HYDROCHLORIDE 5 MG: 5 TABLET ORAL at 16:23

## 2017-06-25 RX ADMIN — DEXAMETHASONE SODIUM PHOSPHATE 10 MG: 10 INJECTION, SOLUTION INTRAMUSCULAR; INTRAVENOUS at 21:38

## 2017-06-25 RX ADMIN — SOTALOL HYDROCHLORIDE 80 MG: 80 TABLET ORAL at 21:38

## 2017-06-25 RX ADMIN — Medication 500 MG: at 07:54

## 2017-06-25 RX ADMIN — ACETAMINOPHEN 650 MG: 325 TABLET, FILM COATED ORAL at 13:37

## 2017-06-25 RX ADMIN — APIXABAN 5 MG: 5 TABLET, FILM COATED ORAL at 18:43

## 2017-06-25 RX ADMIN — OXYCODONE HYDROCHLORIDE 5 MG: 5 TABLET ORAL at 11:26

## 2017-06-25 RX ADMIN — PANTOPRAZOLE SODIUM 40 MG: 40 INJECTION, POWDER, FOR SOLUTION INTRAVENOUS at 07:56

## 2017-06-25 RX ADMIN — FOLIC ACID 1 MG: 1 TABLET ORAL at 07:55

## 2017-06-25 RX ADMIN — SOTALOL HYDROCHLORIDE 80 MG: 80 TABLET ORAL at 07:56

## 2017-06-25 RX ADMIN — ALPRAZOLAM 0.25 MG: 0.25 TABLET ORAL at 18:32

## 2017-06-25 RX ADMIN — ALPRAZOLAM 0.25 MG: 0.25 TABLET ORAL at 22:47

## 2017-06-25 RX ADMIN — ACETAMINOPHEN 650 MG: 325 TABLET, FILM COATED ORAL at 22:40

## 2017-06-25 RX ADMIN — DEXAMETHASONE SODIUM PHOSPHATE 10 MG: 10 INJECTION, SOLUTION INTRAMUSCULAR; INTRAVENOUS at 11:20

## 2017-06-25 RX ADMIN — Medication 325 MG: at 05:49

## 2017-06-25 RX ADMIN — OXYCODONE HYDROCHLORIDE 5 MG: 5 TABLET ORAL at 22:47

## 2017-06-25 RX ADMIN — ESCITALOPRAM OXALATE 20 MG: 20 TABLET ORAL at 07:54

## 2017-06-25 RX ADMIN — ALPRAZOLAM 0.25 MG: 0.25 TABLET ORAL at 07:55

## 2017-06-25 RX ADMIN — ONDANSETRON 4 MG: 2 INJECTION INTRAMUSCULAR; INTRAVENOUS at 08:04

## 2017-06-26 LAB
ANION GAP SERPL CALCULATED.3IONS-SCNC: 9 MMOL/L (ref 4–13)
ANISOCYTOSIS BLD QL SMEAR: PRESENT
BASOPHILS # BLD MANUAL: 0 THOUSAND/UL (ref 0–0.1)
BASOPHILS NFR MAR MANUAL: 0 % (ref 0–1)
BUN SERPL-MCNC: 23 MG/DL (ref 5–25)
CALCIUM SERPL-MCNC: 8.3 MG/DL (ref 8.3–10.1)
CHLORIDE SERPL-SCNC: 99 MMOL/L (ref 100–108)
CO2 SERPL-SCNC: 27 MMOL/L (ref 21–32)
CREAT SERPL-MCNC: 0.65 MG/DL (ref 0.6–1.3)
EOSINOPHIL # BLD MANUAL: 0 THOUSAND/UL (ref 0–0.4)
EOSINOPHIL NFR BLD MANUAL: 0 % (ref 0–6)
ERYTHROCYTE [DISTWIDTH] IN BLOOD BY AUTOMATED COUNT: 17.1 % (ref 11.6–15.1)
GFR SERPL CREATININE-BSD FRML MDRD: >60 ML/MIN/1.73SQ M
GLUCOSE SERPL-MCNC: 131 MG/DL (ref 65–140)
HCT VFR BLD AUTO: 28.3 % (ref 34.8–46.1)
HGB BLD-MCNC: 9.1 G/DL (ref 11.5–15.4)
LYMPHOCYTES # BLD AUTO: 0.11 THOUSAND/UL (ref 0.6–4.47)
LYMPHOCYTES # BLD AUTO: 1 % (ref 14–44)
MACROCYTES BLD QL AUTO: PRESENT
MCH RBC QN AUTO: 30.8 PG (ref 26.8–34.3)
MCHC RBC AUTO-ENTMCNC: 32.2 G/DL (ref 31.4–37.4)
MCV RBC AUTO: 96 FL (ref 82–98)
METAMYELOCYTES NFR BLD MANUAL: 2 % (ref 0–1)
MONOCYTES # BLD AUTO: 0.11 THOUSAND/UL (ref 0–1.22)
MONOCYTES NFR BLD: 1 % (ref 4–12)
NEUTROPHILS # BLD MANUAL: 10.72 THOUSAND/UL (ref 1.85–7.62)
NEUTS SEG NFR BLD AUTO: 96 % (ref 43–75)
NRBC BLD AUTO-RTO: 0 /100 WBCS
PLATELET # BLD AUTO: 371 THOUSANDS/UL (ref 149–390)
PLATELET BLD QL SMEAR: ADEQUATE
PMV BLD AUTO: 9.3 FL (ref 8.9–12.7)
POTASSIUM SERPL-SCNC: 4.2 MMOL/L (ref 3.5–5.3)
RBC # BLD AUTO: 2.95 MILLION/UL (ref 3.81–5.12)
RBC MORPH BLD: PRESENT
SODIUM SERPL-SCNC: 135 MMOL/L (ref 136–145)
WBC # BLD AUTO: 11.17 THOUSAND/UL (ref 4.31–10.16)

## 2017-06-26 PROCEDURE — 85007 BL SMEAR W/DIFF WBC COUNT: CPT | Performed by: INTERNAL MEDICINE

## 2017-06-26 PROCEDURE — C9113 INJ PANTOPRAZOLE SODIUM, VIA: HCPCS | Performed by: HOSPITALIST

## 2017-06-26 PROCEDURE — 80048 BASIC METABOLIC PNL TOTAL CA: CPT | Performed by: INTERNAL MEDICINE

## 2017-06-26 PROCEDURE — 85027 COMPLETE CBC AUTOMATED: CPT | Performed by: INTERNAL MEDICINE

## 2017-06-26 RX ORDER — PANTOPRAZOLE SODIUM 40 MG/1
40 TABLET, DELAYED RELEASE ORAL
Status: DISCONTINUED | OUTPATIENT
Start: 2017-06-27 | End: 2017-06-29 | Stop reason: HOSPADM

## 2017-06-26 RX ORDER — ALPRAZOLAM 0.25 MG/1
0.25 TABLET ORAL
Status: DISCONTINUED | OUTPATIENT
Start: 2017-06-27 | End: 2017-06-27

## 2017-06-26 RX ADMIN — ACETAMINOPHEN 650 MG: 325 TABLET, FILM COATED ORAL at 16:39

## 2017-06-26 RX ADMIN — HYDROMORPHONE HYDROCHLORIDE 0.5 MG: 1 INJECTION, SOLUTION INTRAMUSCULAR; INTRAVENOUS; SUBCUTANEOUS at 09:10

## 2017-06-26 RX ADMIN — FOLIC ACID 1 MG: 1 TABLET ORAL at 08:49

## 2017-06-26 RX ADMIN — ALPRAZOLAM 0.25 MG: 0.25 TABLET ORAL at 08:50

## 2017-06-26 RX ADMIN — DEXAMETHASONE SODIUM PHOSPHATE 10 MG: 10 INJECTION, SOLUTION INTRAMUSCULAR; INTRAVENOUS at 22:26

## 2017-06-26 RX ADMIN — ESCITALOPRAM OXALATE 20 MG: 20 TABLET ORAL at 08:50

## 2017-06-26 RX ADMIN — DEXAMETHASONE SODIUM PHOSPHATE 10 MG: 10 INJECTION, SOLUTION INTRAMUSCULAR; INTRAVENOUS at 16:39

## 2017-06-26 RX ADMIN — OXYCODONE HYDROCHLORIDE 5 MG: 5 TABLET ORAL at 16:39

## 2017-06-26 RX ADMIN — APIXABAN 5 MG: 5 TABLET, FILM COATED ORAL at 19:04

## 2017-06-26 RX ADMIN — Medication 325 MG: at 08:49

## 2017-06-26 RX ADMIN — SOTALOL HYDROCHLORIDE 80 MG: 80 TABLET ORAL at 08:49

## 2017-06-26 RX ADMIN — SOTALOL HYDROCHLORIDE 80 MG: 80 TABLET ORAL at 22:28

## 2017-06-26 RX ADMIN — PANTOPRAZOLE SODIUM 40 MG: 40 INJECTION, POWDER, FOR SOLUTION INTRAVENOUS at 08:51

## 2017-06-26 RX ADMIN — DEXAMETHASONE SODIUM PHOSPHATE 10 MG: 10 INJECTION, SOLUTION INTRAMUSCULAR; INTRAVENOUS at 05:03

## 2017-06-26 RX ADMIN — Medication 1 TABLET: at 09:09

## 2017-06-26 RX ADMIN — ACETAMINOPHEN 650 MG: 325 TABLET, FILM COATED ORAL at 20:29

## 2017-06-26 RX ADMIN — ALPRAZOLAM 0.25 MG: 0.25 TABLET ORAL at 19:04

## 2017-06-26 RX ADMIN — Medication 500 MG: at 08:50

## 2017-06-26 RX ADMIN — DEXAMETHASONE SODIUM PHOSPHATE 10 MG: 10 INJECTION, SOLUTION INTRAMUSCULAR; INTRAVENOUS at 09:09

## 2017-06-26 RX ADMIN — APIXABAN 5 MG: 5 TABLET, FILM COATED ORAL at 08:51

## 2017-06-27 PROCEDURE — G8982 BODY POS GOAL STATUS: HCPCS

## 2017-06-27 PROCEDURE — 97163 PT EVAL HIGH COMPLEX 45 MIN: CPT

## 2017-06-27 PROCEDURE — G8981 BODY POS CURRENT STATUS: HCPCS

## 2017-06-27 RX ORDER — OXYCODONE HYDROCHLORIDE 5 MG/1
5 TABLET ORAL EVERY 4 HOURS
Status: DISCONTINUED | OUTPATIENT
Start: 2017-06-27 | End: 2017-06-29 | Stop reason: HOSPADM

## 2017-06-27 RX ORDER — ALPRAZOLAM 0.5 MG/1
0.5 TABLET ORAL
Status: DISCONTINUED | OUTPATIENT
Start: 2017-06-27 | End: 2017-06-28

## 2017-06-27 RX ORDER — AMOXICILLIN 250 MG
1 CAPSULE ORAL 2 TIMES DAILY
Status: DISCONTINUED | OUTPATIENT
Start: 2017-06-27 | End: 2017-06-28

## 2017-06-27 RX ORDER — ALPRAZOLAM 0.5 MG/1
0.5 TABLET ORAL
Status: DISCONTINUED | OUTPATIENT
Start: 2017-06-27 | End: 2017-06-29 | Stop reason: HOSPADM

## 2017-06-27 RX ORDER — ACETAMINOPHEN 325 MG/1
975 TABLET ORAL EVERY 8 HOURS SCHEDULED
Status: DISCONTINUED | OUTPATIENT
Start: 2017-06-27 | End: 2017-06-29 | Stop reason: HOSPADM

## 2017-06-27 RX ADMIN — Medication 325 MG: at 08:45

## 2017-06-27 RX ADMIN — Medication 1 TABLET: at 08:49

## 2017-06-27 RX ADMIN — OXYCODONE HYDROCHLORIDE 5 MG: 5 TABLET ORAL at 12:27

## 2017-06-27 RX ADMIN — OXYCODONE HYDROCHLORIDE 5 MG: 5 TABLET ORAL at 16:13

## 2017-06-27 RX ADMIN — SOTALOL HYDROCHLORIDE 80 MG: 80 TABLET ORAL at 08:56

## 2017-06-27 RX ADMIN — OXYCODONE HYDROCHLORIDE 5 MG: 5 TABLET ORAL at 23:46

## 2017-06-27 RX ADMIN — DEXAMETHASONE 6 MG: 2 TABLET ORAL at 12:28

## 2017-06-27 RX ADMIN — FOLIC ACID 1 MG: 1 TABLET ORAL at 08:45

## 2017-06-27 RX ADMIN — SOTALOL HYDROCHLORIDE 80 MG: 80 TABLET ORAL at 20:01

## 2017-06-27 RX ADMIN — ALPRAZOLAM 0.5 MG: 0.5 TABLET ORAL at 17:07

## 2017-06-27 RX ADMIN — Medication 500 MG: at 08:45

## 2017-06-27 RX ADMIN — APIXABAN 5 MG: 5 TABLET, FILM COATED ORAL at 08:45

## 2017-06-27 RX ADMIN — ACETAMINOPHEN 650 MG: 325 TABLET, FILM COATED ORAL at 04:34

## 2017-06-27 RX ADMIN — ESCITALOPRAM OXALATE 20 MG: 20 TABLET ORAL at 08:45

## 2017-06-27 RX ADMIN — ACETAMINOPHEN 975 MG: 325 TABLET, FILM COATED ORAL at 16:13

## 2017-06-27 RX ADMIN — PANTOPRAZOLE SODIUM 40 MG: 40 TABLET, DELAYED RELEASE ORAL at 08:49

## 2017-06-27 RX ADMIN — APIXABAN 5 MG: 5 TABLET, FILM COATED ORAL at 17:08

## 2017-06-27 RX ADMIN — DEXAMETHASONE 6 MG: 2 TABLET ORAL at 08:46

## 2017-06-27 RX ADMIN — ALPRAZOLAM 0.25 MG: 0.25 TABLET ORAL at 08:45

## 2017-06-27 RX ADMIN — ACETAMINOPHEN 975 MG: 325 TABLET, FILM COATED ORAL at 21:04

## 2017-06-27 RX ADMIN — HYDROMORPHONE HYDROCHLORIDE 0.5 MG: 1 INJECTION, SOLUTION INTRAMUSCULAR; INTRAVENOUS; SUBCUTANEOUS at 08:41

## 2017-06-27 RX ADMIN — ALPRAZOLAM 0.5 MG: 0.5 TABLET ORAL at 12:27

## 2017-06-27 RX ADMIN — Medication 1 TABLET: at 17:07

## 2017-06-27 RX ADMIN — DEXAMETHASONE 6 MG: 2 TABLET ORAL at 17:07

## 2017-06-27 RX ADMIN — OXYCODONE HYDROCHLORIDE 5 MG: 5 TABLET ORAL at 20:01

## 2017-06-27 RX ADMIN — OXYCODONE HYDROCHLORIDE 5 MG: 5 TABLET ORAL at 02:40

## 2017-06-28 PROCEDURE — 97112 NEUROMUSCULAR REEDUCATION: CPT

## 2017-06-28 PROCEDURE — 97530 THERAPEUTIC ACTIVITIES: CPT

## 2017-06-28 PROCEDURE — 97167 OT EVAL HIGH COMPLEX 60 MIN: CPT

## 2017-06-28 PROCEDURE — G8988 SELF CARE GOAL STATUS: HCPCS

## 2017-06-28 PROCEDURE — G8987 SELF CARE CURRENT STATUS: HCPCS

## 2017-06-28 RX ORDER — AMOXICILLIN 250 MG
2 CAPSULE ORAL 2 TIMES DAILY
Status: DISCONTINUED | OUTPATIENT
Start: 2017-06-28 | End: 2017-06-29 | Stop reason: HOSPADM

## 2017-06-28 RX ORDER — ALPRAZOLAM 0.25 MG/1
0.25 TABLET ORAL
Status: DISCONTINUED | OUTPATIENT
Start: 2017-06-28 | End: 2017-06-29 | Stop reason: HOSPADM

## 2017-06-28 RX ORDER — POLYETHYLENE GLYCOL 3350 17 G/17G
17 POWDER, FOR SOLUTION ORAL DAILY
Status: DISCONTINUED | OUTPATIENT
Start: 2017-06-28 | End: 2017-06-29 | Stop reason: HOSPADM

## 2017-06-28 RX ADMIN — DEXAMETHASONE 6 MG: 2 TABLET ORAL at 08:43

## 2017-06-28 RX ADMIN — POLYETHYLENE GLYCOL 3350 17 G: 17 POWDER, FOR SOLUTION ORAL at 13:25

## 2017-06-28 RX ADMIN — OXYCODONE HYDROCHLORIDE 5 MG: 5 TABLET ORAL at 08:43

## 2017-06-28 RX ADMIN — OXYCODONE HYDROCHLORIDE 5 MG: 5 TABLET ORAL at 17:10

## 2017-06-28 RX ADMIN — DEXAMETHASONE 6 MG: 2 TABLET ORAL at 13:24

## 2017-06-28 RX ADMIN — ACETAMINOPHEN 975 MG: 325 TABLET, FILM COATED ORAL at 13:24

## 2017-06-28 RX ADMIN — APIXABAN 5 MG: 5 TABLET, FILM COATED ORAL at 08:43

## 2017-06-28 RX ADMIN — OXYCODONE HYDROCHLORIDE 5 MG: 5 TABLET ORAL at 23:48

## 2017-06-28 RX ADMIN — Medication 2 TABLET: at 13:24

## 2017-06-28 RX ADMIN — SOTALOL HYDROCHLORIDE 80 MG: 80 TABLET ORAL at 08:44

## 2017-06-28 RX ADMIN — ALPRAZOLAM 0.5 MG: 0.5 TABLET ORAL at 08:43

## 2017-06-28 RX ADMIN — Medication 500 MG: at 08:43

## 2017-06-28 RX ADMIN — ACETAMINOPHEN 975 MG: 325 TABLET, FILM COATED ORAL at 06:06

## 2017-06-28 RX ADMIN — ALPRAZOLAM 0.25 MG: 0.25 TABLET ORAL at 13:25

## 2017-06-28 RX ADMIN — ACETAMINOPHEN 975 MG: 325 TABLET, FILM COATED ORAL at 23:47

## 2017-06-28 RX ADMIN — OXYCODONE HYDROCHLORIDE 5 MG: 5 TABLET ORAL at 21:10

## 2017-06-28 RX ADMIN — PANTOPRAZOLE SODIUM 40 MG: 40 TABLET, DELAYED RELEASE ORAL at 06:06

## 2017-06-28 RX ADMIN — OXYCODONE HYDROCHLORIDE 5 MG: 5 TABLET ORAL at 04:30

## 2017-06-28 RX ADMIN — APIXABAN 5 MG: 5 TABLET, FILM COATED ORAL at 18:21

## 2017-06-28 RX ADMIN — ESCITALOPRAM OXALATE 20 MG: 20 TABLET ORAL at 08:43

## 2017-06-28 RX ADMIN — OXYCODONE HYDROCHLORIDE 5 MG: 5 TABLET ORAL at 13:25

## 2017-06-28 RX ADMIN — Medication 1 TABLET: at 08:44

## 2017-06-28 RX ADMIN — DEXAMETHASONE 6 MG: 2 TABLET ORAL at 18:21

## 2017-06-28 RX ADMIN — Medication 325 MG: at 06:06

## 2017-06-28 RX ADMIN — FOLIC ACID 1 MG: 1 TABLET ORAL at 08:46

## 2017-06-28 RX ADMIN — SOTALOL HYDROCHLORIDE 80 MG: 80 TABLET ORAL at 22:00

## 2017-06-28 RX ADMIN — Medication 2 TABLET: at 18:21

## 2017-06-29 VITALS
WEIGHT: 204.81 LBS | BODY MASS INDEX: 29.32 KG/M2 | SYSTOLIC BLOOD PRESSURE: 136 MMHG | OXYGEN SATURATION: 94 % | DIASTOLIC BLOOD PRESSURE: 76 MMHG | RESPIRATION RATE: 16 BRPM | HEART RATE: 82 BPM | HEIGHT: 70 IN | TEMPERATURE: 97.5 F

## 2017-06-29 PROCEDURE — 97110 THERAPEUTIC EXERCISES: CPT

## 2017-06-29 PROCEDURE — 97530 THERAPEUTIC ACTIVITIES: CPT

## 2017-06-29 PROCEDURE — 97535 SELF CARE MNGMENT TRAINING: CPT

## 2017-06-29 RX ORDER — OXYCODONE HYDROCHLORIDE 5 MG/1
5 TABLET ORAL EVERY 4 HOURS
Qty: 60 TABLET | Refills: 0
Start: 2017-06-29

## 2017-06-29 RX ORDER — ALPRAZOLAM 0.5 MG/1
0.5 TABLET ORAL
Qty: 30 TABLET | Refills: 0
Start: 2017-06-29

## 2017-06-29 RX ORDER — ACETAMINOPHEN 325 MG/1
975 TABLET ORAL EVERY 8 HOURS
Qty: 30 TABLET | Refills: 0 | Status: SHIPPED | OUTPATIENT
Start: 2017-06-29

## 2017-06-29 RX ORDER — POLYETHYLENE GLYCOL 3350 17 G/17G
17 POWDER, FOR SOLUTION ORAL DAILY
Qty: 14 EACH | Refills: 0
Start: 2017-06-29

## 2017-06-29 RX ORDER — OXYCODONE HYDROCHLORIDE 5 MG/1
TABLET ORAL
Qty: 30 TABLET | Refills: 0
Start: 2017-06-29

## 2017-06-29 RX ORDER — ALPRAZOLAM 0.25 MG/1
0.25 TABLET ORAL
Qty: 30 TABLET | Refills: 0
Start: 2017-06-29

## 2017-06-29 RX ORDER — AMOXICILLIN 250 MG
2 CAPSULE ORAL 2 TIMES DAILY
Refills: 0
Start: 2017-06-29

## 2017-06-29 RX ORDER — DEXAMETHASONE 6 MG/1
6 TABLET ORAL
Refills: 0
Start: 2017-06-29

## 2017-06-29 RX ORDER — PANTOPRAZOLE SODIUM 40 MG/1
40 TABLET, DELAYED RELEASE ORAL
Refills: 0
Start: 2017-06-29

## 2017-06-29 RX ADMIN — OXYCODONE HYDROCHLORIDE 5 MG: 5 TABLET ORAL at 03:31

## 2017-06-29 RX ADMIN — Medication 325 MG: at 06:05

## 2017-06-29 RX ADMIN — ACETAMINOPHEN 975 MG: 325 TABLET, FILM COATED ORAL at 14:40

## 2017-06-29 RX ADMIN — PANTOPRAZOLE SODIUM 40 MG: 40 TABLET, DELAYED RELEASE ORAL at 06:05

## 2017-06-29 RX ADMIN — ALPRAZOLAM 0.25 MG: 0.25 TABLET ORAL at 08:19

## 2017-06-29 RX ADMIN — HYDROMORPHONE HYDROCHLORIDE 0.5 MG: 1 INJECTION, SOLUTION INTRAMUSCULAR; INTRAVENOUS; SUBCUTANEOUS at 16:14

## 2017-06-29 RX ADMIN — Medication 2 TABLET: at 08:22

## 2017-06-29 RX ADMIN — OXYCODONE HYDROCHLORIDE 5 MG: 5 TABLET ORAL at 08:19

## 2017-06-29 RX ADMIN — OXYCODONE HYDROCHLORIDE 5 MG: 5 TABLET ORAL at 17:24

## 2017-06-29 RX ADMIN — FOLIC ACID 1 MG: 1 TABLET ORAL at 08:22

## 2017-06-29 RX ADMIN — Medication 2 TABLET: at 17:22

## 2017-06-29 RX ADMIN — APIXABAN 5 MG: 5 TABLET, FILM COATED ORAL at 08:20

## 2017-06-29 RX ADMIN — OXYCODONE HYDROCHLORIDE 5 MG: 5 TABLET ORAL at 13:15

## 2017-06-29 RX ADMIN — APIXABAN 5 MG: 5 TABLET, FILM COATED ORAL at 17:22

## 2017-06-29 RX ADMIN — DEXAMETHASONE 6 MG: 2 TABLET ORAL at 17:22

## 2017-06-29 RX ADMIN — DEXAMETHASONE 6 MG: 2 TABLET ORAL at 13:15

## 2017-06-29 RX ADMIN — Medication 500 MG: at 08:22

## 2017-06-29 RX ADMIN — ALPRAZOLAM 0.25 MG: 0.25 TABLET ORAL at 14:01

## 2017-06-29 RX ADMIN — DEXAMETHASONE 6 MG: 2 TABLET ORAL at 08:20

## 2017-06-29 RX ADMIN — ACETAMINOPHEN 975 MG: 325 TABLET, FILM COATED ORAL at 06:05

## 2017-06-29 RX ADMIN — SOTALOL HYDROCHLORIDE 80 MG: 80 TABLET ORAL at 08:24

## 2017-06-29 RX ADMIN — ESCITALOPRAM OXALATE 20 MG: 20 TABLET ORAL at 08:21

## 2017-06-30 ENCOUNTER — GENERIC CONVERSION - ENCOUNTER (OUTPATIENT)
Dept: OTHER | Facility: OTHER | Age: 72
End: 2017-06-30

## 2018-01-11 NOTE — MISCELLANEOUS
Message  During telephone call discussing post-RT chemotherapy, the patient complained of peripheral neuropathy affecting her fingertips and feet  She describes the sensation in her feet as a "shell" covering her toes  The neuropathy in her fingertips is occasionally affecting her ability to write/button clothes, etc  She did not bring this to Dr Lalo Emerson attention at her office visit  Patient with grade 2 taxol-induced peripheral neuropathy  I discussed with the patient treatment change to taxotere 65 mg/m2, carboplatin AUC 5 and neulasta  She verbally consented, and agreed to proceed  Plan  Uterine carcinosarcoma    · Dexamethasone 4 MG Oral Tablet; Take 2 tabs PO BID the day before chemo, then  take 2 tabs PO at night, the day you received chemo, then take 2 tabs PO BID the  after chemo    Signatures   Electronically signed by : LUCIANO Cohen;  Apr 1 2016  2:28PM EST                       (Author)

## 2018-01-12 NOTE — MISCELLANEOUS
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 1) (D64 81)   4  Aortic stenosis, mild (424 1) (I35 0)   5  History of Arterial embolus and thrombosis (444 9) (I74 9)   6  Atrial fibrillation (427 31) (I48 91)   7  Benign essential hypertension (401 1) (I10)   8  Closed Colles' fracture of left radius, initial encounter (813 41) (S52 532A)   9  Depression with anxiety (300 4) (F41 8)   10  Diarrhea (787 91) (R19 7)   11  DVT (deep venous thrombosis) (453 40) (I82 409)   12  Dyslipidemia (272 4) (E78 5)   13  Encounter for screening mammogram for breast cancer (V76 12) (Z12 31)   14  Flu vaccine need (V04 81) (Z23)   15  Gait disturbance (781 2) (R26 9)   16  Generalized osteoarthritis of multiple sites (715 09) (M15 9)   17  Hip bursitis, right (726 5) (M70 71)   18  Hip pain, right (719 45) (M25 551)   19  Hyperbilirubinemia (782 4) (E80 6)   20  Hypokalemia (276 8) (E87 6)   21  Hypomagnesemia (275 2) (E83 42)   22  Insomnia (780 52) (G47 00)   23  Left Ankle Joint Pain (719 47)   24  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)   25  Left ventricular hypertrophy (429 3) (I51 7)   26  Liver metastasis (197 7) (C78 7)   27  Lower back pain (724 2) (M54 5)   28  Lung metastases (197 0) (C78 00)   29  Morbid or severe obesity due to excess calories (278 01) (E66 01)   30  Myalgia (729 1) (M79 1)   31  Need for prophylactic vaccination and inoculation against influenza (V04 81) (Z23)   32  Need for shingles vaccine (V04 89) (Z23)   33  Need for tetanus booster (V03 7) (Z23)   34  Need for vaccination with 13-polyvalent pneumococcal conjugate vaccine (V03 82) (Z23)   35  Osteoarthritis of right hip (239 95) (M16 11)   36  Osteoarthritis of right knee (715 96) (M17 11)   37  Osteoarthritis, localized, knee (715 36) (M17 10)   38  PFO (patent foramen ovale) (745 5) (Q21 1)   39  Proteinuria (791 0) (R80 9)   40  Pulmonary embolism (415 19) (I26 99)   41  Pulmonary hypertension (416 8) (I27 2)   42  Screening for colon cancer (V76 51) (Z12 11)   43  Screening for depression (V79 0) (Z13 89)   44  Screening for genitourinary condition (V81 6) (Z13 89)   45  Screening for hyperlipidemia (V77 91) (Z13 220)   46  Screening for neurological condition (V80 09) (Z13 89)   47  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)   48  Visit for pre-operative examination (V72 84) (Z01 818)   49  Wrist fracture (814 00) (S62 109A)    Past Medical History    1  History of Accelerated essential hypertension (401 0) (I10)   2  History of Acute upper respiratory infection (465 9) (J06 9)   3  History of Anemia due to acute blood loss (285 1) (D62)   4  History of Arterial embolus and thrombosis (444 9) (I74 9)   5  History of Drug-induced neutropenia (288 03,E980 5) (D70 2)   6  History of Drug-induced peripheral neuropathy (357 6,E980 5) (G62 0)   7  History of Hematoma of arm (923 9) (S40 029A)   8  History of essential hypertension (V12 59) (Z86 79)   9  History of Impacted cerumen of right ear (380 4) (H61 21)   10  History of Postmenopausal bleeding (627 1) (N95 0)   11  History of Uterine mass (625 8) (N85 9)    Surgical History    1  History of Arterial Embolectomy   2  History of Exploratory Laparotomy   3  History of Laparoscopy (Diagnostic)   4  History of Salpingo-oophorectomy Bilateral   5  History of Total Abdominal Hysterectomy   6  History of Tubal Ligation    Family History  Mother    1  Family history of Coronary Artery Disease (V17 49)   2  Family history of Depression   3  Family history of Diabetes Mellitus (V18 0)   4  Family history of Hypertension (V17 49)  Father    5  Family history of Coronary Artery Disease (V17 49)   6   Family history of pulmonary embolism (V17 49) (Z82 49)   7  Family history of Hypertension (V17 49)   8  Family history of S/P IVC filter    Social History    · Being A Social Drinker   · Denied: History of Drug Use   · Never a smoker   · Non smoker (V49 89) (Z78 9)    Current Meds   1  ALPRAZolam 0 25 MG Oral Tablet; TAKE 1 TABLET DAILY AS NEEDED FOR ANXIETY; Therapy: 82ZTE6714 to [id number])  Requested for: 23Kid7337; Last   Rx:89Auy8959 Ordered   2  Aspirin 81 MG TABS; TAKE 1 TABLET DAILY; Therapy: 35Tkt7429 to Recorded   3  Calcium 600+D 600-200 MG-UNIT Oral Tablet; Take 1 tablet daily; Therapy: 45QXU6617 to Recorded   4  ClonazePAM 0 5 MG Oral Tablet; Take 1 tablet twice daily; Therapy: 49Bdl7787 to (Evaluate:68Lwi8902)  Requested for: 81SLT7124; Last   Rx:23May2017 Ordered   5  Eliquis 5 MG Oral Tablet; take 1 tablet every twelve hours; Therapy: 00Hfk6101 to (Evaluate:87Tyo8721)  Requested for: 25VGZ4138; Last   Rx:03Mar2017 Ordered   6  Escitalopram Oxalate 20 MG Oral Tablet; take 1 tablet every day; Therapy: 26ENF3167 to (Evaluate:19May2017)  Requested for: 20Mar2017; Last   Rx:20Mar2017 Ordered   7  Ferrous Sulfate 325 (65 Fe) MG Oral Tablet; TAKE 1 TABLET TWICE DAILY; Therapy: 94PBW6563 to (Evaluate:25Jun2017)  Requested for: 48SNT3065; Last   Rx:26May2017 Ordered   8  Folic Acid 1 MG Oral Tablet; TAKE 1 TABLET DAILY; Therapy: 61BDR9168 to (Evaluate:25Jun2017)  Requested for: 85CGH9617; Last   Rx:26May2017 Ordered   9  HydroCHLOROthiazide 25 MG Oral Tablet; TAKE 1 TABLET DAILY; Therapy: 42KMR3163 to (Gary Manners)  Requested for: 30TMN9818; Last   Rx:01May2017 Ordered   10  Lisinopril 40 MG Oral Tablet; take one tablet by mouth every day; Therapy: 67VSP6100 to (Evaluate:44Agx7306)  Requested for: 25BYQ6848; Last    Rx:02Fkj4708 Ordered   11  Metoprolol Tartrate 25 MG Oral Tablet; TAKE 1 TABLET TWICE DAILY;     Therapy: 32JYP0151 to (Evaluate:13Wdw5273)  Requested for: 20Feb2017; Last Rx:61Rbp6440 Ordered   12  Vitamin C 500 MG Oral Tablet; Take 1 tablet twice daily; Therapy: 80BEH5431 to (Evaluate:25Jun2017)  Requested for: 31XDC3162; Last    Rx:55Mjc2253 Ordered   13  Vitamin D3 1000 UNIT Oral Tablet; TAKE 1 TABLET DAILY; Therapy: 96ENQ0969 to (Nova Felty)  Requested for: 01Ztz4467; Last    Rx:38Ozc8538 Ordered    Allergies    1  No Known Drug Allergies    2  No Known Environmental Allergies   3  No Known Food Allergies    Health Management  Encounter for screening mammogram for breast cancer   Digital Bilateral Screening Mammogram With CAD; every 1 year; Next Due: 92ODW5960; Overdue  Screening for colon cancer   COLONOSCOPY; every 10 years; Deferred until 09Aug2017: Pt Refuses; Temporary Deferral  Health Maintenance Medicare Annual Wellness Visit; every 1 year; Next Due: 80NPV4529; Overdue    Future Appointments    Date/Time Provider Specialty Site   07/05/2017 02:15 PM MASSIEL Mcclellan  Orthopedic Surgery [facility]   07/10/2017 09:45 AM MASSIEL Mcclellan  [address] [place] [address]   07/18/2017 05:30 PM MASSIEL Mcclellan  Orthopedic Surgery [facility]   07/25/2017 03:15 PM MASSIEL Mcclellan  Orthopedic Surgery [address]   06/29/2017 10:00 AM Rufina Pope MD Cardiology [facility]ARDIOLOGY [facility]   07/03/2017 11:00 AM MASSIEL Carr   Orthopedic [address] [place] [address]     Signatures   Electronically signed by : Archana Marin, ; Jun 23 2017  8:41AM EST                       (Author)    Electronically signed by : MASSIEL Escobar ; Jun 23 2017  8:00PM EST                       (Review)

## 2018-01-12 NOTE — RESULT NOTES
Verified Results  (1) LIPID PANEL, FASTING 15Apr2016 12:28PM Froy Chapman   Triglyceride:         Normal              <150 mg/dl       Borderline High    150-199 mg/dl       High               200-499 mg/dl       Very High          >499 mg/dl  Cholesterol:         Desirable        <200 mg/dl      Borderline High  200-239 mg/dl      High             >239 mg/dl  HDL Cholesterol:        High    >59 mg/dL      Low     <41 mg/dL  LDL CALCULATED:    This screening LDL is a calculated result  It does not have the accuracy of the Direct Measured LDL in the monitoring of patients with hyperlipidemia and/or statin therapy  Direct Measure LDL (GIR474) must be ordered separately in these patients  Test Name Result Flag Reference   CHOLESTEROL 158 mg/dL     HDL,DIRECT 71 mg/dL H 40-60   Specimen collection should occur prior to Metamizole administration due to the potential for falsely depressed results  LDL CHOLESTEROL CALCULATED 70 mg/dL  0-100   TRIGLYCERIDES 87 mg/dL  <=150   Specimen collection should occur prior to N-Acetylcysteine or Metamizole administration due to the potential for falsely depressed results  Discussion/Summary   Alex, your cholesterol looks good       Signatures   Electronically signed by : MASSIEL Alvarado ; Apr 16 2016  6:23PM EST                       (Author)

## 2018-01-12 NOTE — MISCELLANEOUS
Provider Comments  Provider Comments:   Pt was a n/s 6/6/17  LMOM to call back and make new apt        Signatures   Electronically signed by : Emelie Bloch; Jun 7 2017  6:08PM EST                       (Author)

## 2018-01-13 VITALS
BODY MASS INDEX: 32.58 KG/M2 | TEMPERATURE: 97.8 F | RESPIRATION RATE: 16 BRPM | DIASTOLIC BLOOD PRESSURE: 80 MMHG | SYSTOLIC BLOOD PRESSURE: 128 MMHG | WEIGHT: 220 LBS | HEIGHT: 69 IN | HEART RATE: 90 BPM

## 2018-01-13 VITALS — HEIGHT: 69 IN | SYSTOLIC BLOOD PRESSURE: 100 MMHG | DIASTOLIC BLOOD PRESSURE: 68 MMHG | HEART RATE: 89 BPM

## 2018-01-13 VITALS — DIASTOLIC BLOOD PRESSURE: 82 MMHG | SYSTOLIC BLOOD PRESSURE: 116 MMHG | HEART RATE: 94 BPM

## 2018-01-13 NOTE — PROGRESS NOTES
Preliminary Nursing Report                Patient Information    Initial Encounter Entry Date:   2017 10:35 AM EST (Automated Transmission Automated Transmission)       Last Modified:   {Frida Iqbal}              Legal Name: Justen [facility] Zafar Number:        YOB: 1945        Age (years): 73        Gender: F        Body Mass Index (BMI): 32 kg/m2        Height: 69 in  Weight: 220 lbs (100 kgs)           Address:   [place] [id number]               Phone: [phone]   (consent to leave messages)        Email:        Ethnicity: Decline to State        Amish:        Marital Status:        Preferred Language: English        Race: Other Race                    Patient Insurance Information        Primary Insurance Information Carrier Name: {Primary  CarrierName}           Carrier Address:   {Primary  CarrierAddress}              Carrier Phone: {Primary  CarrierPhone}          Group Number: {Primary  GroupNumber}          Policy Number: {Primary  PolicyNumber}          Insured Name: {Primary  InsuredName}          Insured : {Primary  InsuredDOB}          Relationship to Insured: {Primary  RelationshiptoInsured}           Secondary Insurance Information Carrier Name: {Secondary  CarrierName}           Carrier Address:   {Secondary  CarrierAddress}              Carrier Phone: {Secondary  CarrierPhone}          Group Number: {Secondary  GroupNumber}          Policy Number: {Secondary  PolicyNumber}          Insured Name: {Secondary  InsuredName}          Insured : {Secondary  InsuredDOB}          Relationship to Insured: {Secondary  RelationshiptoInsured}                       Health Profile   Booking #:   Jeana Penn #: [id number]               DOS: 2017    Surgery : TOTAL HIP REPLACEMENT    Add'l Procedures/Notes:     Surgery Risk: Intermediate          Precautions     Aortic stenosis, mild       Atrial fibrillation       Left ventricular hypertrophy       Pulmonary hypertension                   Allergies    No Known Drug Allergies       No Known Environmental Allergies       No Known Food Allergies       Clinical Comments: Reaction Type: , Reaction: , Severity:              Medications    ALPRAZolam 0 25 MG Oral Tablet       Aspirin 81 MG TABS       Calcium 600+D 600-200 MG-UNIT Oral Tablet       ClonazePAM 0 5 MG Oral Tablet       Eliquis 5 MG Oral Tablet       Escitalopram Oxalate 20 MG Oral Tablet       Ferrous Sulfate 325 (65 Fe) MG Oral Tablet       Folic Acid 1 MG Oral Tablet       HydroCHLOROthiazide 25 MG Oral Tablet       Lisinopril 40 MG Oral Tablet       Metoprolol Tartrate 25 MG Oral Tablet       Vitamin C 500 MG Oral Tablet       Vitamin D3 1000 UNIT Oral Tablet               Conditions    Abnormal electrocardiogram       Aftercare following surgery for injury and trauma       Anemia associated with chemotherapy       Aortic stenosis, mild       Atrial fibrillation       Benign essential hypertension       Closed Colles' fracture of left radius, initial encounter       Depression with anxiety       Diarrhea       DVT (deep venous thrombosis)       Dyslipidemia       Encounter for screening mammogram for breast cancer       Flu vaccine need       Gait disturbance       Generalized osteoarthritis of multiple sites       Hip bursitis, right       Hip pain, right       Hyperbilirubinemia       Hypokalemia       Hypomagnesemia       Insomnia       Left Ankle Joint Pain       Left subclavian artery occlusion       Left ventricular hypertrophy       Limited Exercise Tolerance       Liver metastasis       Lung metastases       Morbid or severe obesity due to excess calories       Myalgia       Need for prophylactic vaccination and inoculation against influenza       Need for shingles vaccine       Need for tetanus booster       Need for vaccination with 13-polyvalent pneumococcal conjugate vaccine       Osteoarthritis of right hip Osteoarthritis of right knee       Osteoarthritis, localized, knee       PFO (patent foramen ovale)       Proteinuria       Pulmonary embolism       Pulmonary hypertension       Screening for colon cancer       Screening for depression       Screening for genitourinary condition       Screening for hyperlipidemia       Screening for neurological condition       Uterine carcinosarcoma       Visit for pre-operative examination       Wrist fracture               Family History    None             Surgical History    None             Social History    Being A Social Drinker       Denies Drug Use       Never a smoker       Non smoker                               Patient Instructions       Medical Procedure Risk  NPO Instructions   The day before surgery it is recommended to have a light dinner at your usual time and you are allowed a light snack early in the evening  Do not eat anything heavy or eat a big meal after 7pm  Do not eat or drink anything after midnight prior to your surgery  If you are supposed to take any of your medications, do so with a sip of water  Failure to follow these instructions can lead to an increased risk of lung complications and may result in a delay or cancellation of your procedure  If you have any questions, contact your institution for further instructions  No candy, no gum, no mints, no chewing tobacco          Lisinopril 40 MG Oral Tablet  Medication Instruction (ACE/ARB - Blood Pressure Medication) 1  Please continue the following medications up to the evening before surgery, but do not take it on the day of surgery  Please restart your medications as soon as clinically feasible  Aspirin 81 MG TABS  Medication Instruction (Aspirin - Blood Thinners) 112, 104, 105, 114, 113, 103, 110, 107, 108, 109, 111, 106  Please continue to take this medication on your normal schedule  If this is an oral medication and you take in the morning, you may do so with a sip of water   However, your surgeon may have you stop aspirin up to a week early if you are having intracranial, middle ear, posterior eye, spine surgery or prostate surgery  ALPRAZolam 0 25 MG Oral Tablet, , ClonazePAM 0 5 MG Oral Tablet  Medication Instruction (Benzodiazepine) 15  Please continue the following medications, if needed, up to and including the day of surgery (with a sip of water)  Metoprolol Tartrate 25 MG Oral Tablet  Medication Instruction (Beta Blocker) 3, 2, c, 4, 6, 5  Please continue to take this medication on your normal schedule  If this is an oral medication and you take in the morning, you may do so with a sip of water  Eliquis 5 MG Oral Tablet  Medication Instruction (Direct Xa inhibitor)   With physician consultation, please stop the triggering medication at least 3 days before major surgery or neuraxial procedures  HydroCHLOROthiazide 25 MG Oral Tablet  Medication Instruction (Diuretics - Water Pills) 28, 29  Please continue the following medications up to the evening before surgery, but do not take it on the day of surgery  Escitalopram Oxalate 20 MG Oral Tablet  Medication Instruction (SSRI - Antidepressants) 80  Please continue to take this medication on your normal schedule  If this is an oral medication and you take in the morning, you may do so with a sip of water  Testing Considerations       ? Chest X-ray (CXR) t  Consider a Chest X-Ray (CXR) if patient is having respiratory symptoms  Triggered by: Pulmonary hypertension, Hyperbilirubinemia, Left ventricular hypertrophy, Lung metastases         ? Coagulation Tests (PT/PTT/INR) t  Triggered by: Hyperbilirubinemia, Left subclavian artery occlusion         ? Complete Blood Count (CBC) t  If test was completed and normal within last six months, repeat test is not necessary    Triggered by: Anemia associated with chemotherapy, Atrial fibrillation, Pulmonary hypertension, Hyperbilirubinemia, Left subclavian artery occlusion, Left ventricular hypertrophy, Liver metastasis, Lung metastases, Proteinuria         ? Comprehensive Metabolic Panel (CMP) t  If test was completed and normal within last six months, repeat test is not necessary  Triggered by: Atrial fibrillation, Pulmonary hypertension, Hyperbilirubinemia, Left subclavian artery occlusion, Liver metastasis, Lung metastases, Proteinuria         ? Electrocardiogram (ECG) t  Patient does not need new test if normal ECG is present within the last six months and no change in clinical condition  Triggered by: Abnormal electrocardiogram, Aortic stenosis, mild, Atrial fibrillation, Benign essential hypertension, Pulmonary hypertension, Hyperbilirubinemia, Hypokalemia, Left ventricular hypertrophy, Morbid or severe obesity due to excess calories, Pulmonary embolism, Age or Facility Rec         ? Hemoglobin A1c (HbA1c) client  If test was completed and normal within the last three months, repeat test is not necessary  Triggered by: Age or Facility Rec         ? Type and Screen client  Type and Screen - Blood: If there is anticipated or possible large blood loss with this procedure, then a Type and Screen for Blood should be ordered  Triggered by: Age or Facility Rec         ? Urinalysis t  Urinalysis may be appropriate if recent urinary symptoms or implants are being placed in surgical procedure  Triggered by: Proteinuria               Consultations       ? Cardiac Consult (Major CHF)   If the patient is having increasing difficulty breathing or fluid retention then a cardiac consult is indicated  Otherwise, optimization of medical therapy is recommended under the direction of the PCP or Cardiologist   Triggered by: Pulmonary hypertension, Left ventricular hypertrophy         ? Cardiac Consult (Major Rate) c  If the patient has a heart rate of greater than 100 bpm, or if the heart rhythm disturbance is new, then a cardiac consult is indicated   Otherwise, optimization of medical therapy is recommended under the direction of the PCP or cardiologist   Triggered by: Atrial fibrillation         ? Cardiac Consult (Major Valve)   If the patient has moderate or severe valvular stenosis or regurgitation then a cardiac consult is indicated  It is recommended that the patient undergo a preoperative echocardiography if there has been either:  1) no prior echocardiography within 1 year or   2) a significant change in clinical status or physical examination since last evaluation   - For adults who meet standard indications for valvular intervention (replacement and repair) on the basis of symptoms and severity of stenosis or regurgitation, valvular intervention before elective non-cardiac surgery is effective in reducing perioperative risk  - appropriate intraoperative and postoperative hemodynamic monitoring is reasonable in adults with asymptomatic severe valvular disease   - Antibiotic prophylaxis will likely be required  Triggered by: Aortic stenosis, mild         ? Limited Exercise Tolerance   If a cardiopulmonary issue is suspected then Beta Blocker Therapy, possible initiation of statins, and further cardiovascular testing are recommended if it will   If patient has limited exercise tolerance, please consider likelihood of this being secondary to other causes (e g  hip or knee pain)  Perioperative initiation of statins may be considered in patients with clinical indications who are undergoing elevated-risk procedures  Triggered by: Limited Exercise Tolerance         ? PCP eval/Echo t  Triggered by: Aortic stenosis, mild, Pulmonary hypertension, Left ventricular hypertrophy         ? Primary Care Physician Evaluation   Primary care physician may need to evaluate patient prior to surgery  This is likely NOT necessary if the listed conditions are chronic and stable    Triggered by: Abnormal electrocardiogram, Anemia associated with chemotherapy, Diarrhea, DVT (deep venous thrombosis), Left subclavian artery occlusion, Left ventricular hypertrophy, Lung metastases, Proteinuria, Pulmonary embolism               Miscellaneous Questions         Question: Are you able to walk up a flight of stairs, walk up a hill or do heavy housework WITHOUT having chest pain or shortness of breath? Answer: NO                   Allergies/Conditions/Medications Not Found        The following were not recognized by our system when generating the recommendations  Please consider if this would impact any preoperative protocols  ? Being A Social Drinker       ? Denies Drug Use       ? Encounter for screening mammogram for breast cancer       ? Left Ankle Joint Pain       ? Never a smoker       ? Non smoker       ? PFO (patent foramen ovale)       ? Screening for hyperlipidemia       ? Screening for neurological condition       ? Folic Acid 1 MG Oral Tablet       ? Vitamin C 500 MG Oral Tablet                  Appointment Information         Date:    06/19/2017        Location:    [place]        Address:           Directions:                      Footnotes revision 14      ?? Denotes a free-text entry  Legal Disclaimer: Any and all recommendations and services provided herein are designed to assist in the preoperative care of the patient  Nothing contained herein is designed to replace, eliminate or alleviate the responsibility of the attending physician to supervise and determine the patient?s preoperative care and course of treatment  Failure to provide complete, accurate information may negatively impact the system?s ability to recommend the proper preoperative protocol  THE ATTENDING PHYSICIAN IS RESPONSIBLE TO REVIEW THE SUGGESTED PREOPERATIVE PROTOCOLS/COURSE OF TREATMENT AND PRESCRIBE THE FINAL COURSE OF PREOPERATIVE TREATMENT IN CONSULTATION WITH THE PATIENT  THE [employer] SYSTEM AND ITS MATERIALS ARE PROVIDED ? AS IS? WITHOUT WARRANTY OF ANY KIND, EXPRESS OR IMPLIED, INCLUDING, BUT NOT LIMITED TO, WARRANTIES OF PERFORMANCE OR MERCHANTABILITY OR FITNESS FOR A PARTICULAR PURPOSE  PATIENT AND PHYSICIANS HEREBY AGREE THAT THEIR USE OF THE MATERIALS AND RESOURCES ACT AS A CONSENT TO RELEASE AND WAIVE ePREOP FROM ANY AND ALL CLAIMS OF WARRANTY, TORT OR CONTRACT LAW OF ANY KIND  Electronically signed by:Quinten BARBER    Jun 22 2017  5:05PM EST

## 2018-01-14 VITALS — SYSTOLIC BLOOD PRESSURE: 100 MMHG | DIASTOLIC BLOOD PRESSURE: 65 MMHG | HEART RATE: 69 BPM

## 2018-01-16 NOTE — MISCELLANEOUS
Chief Complaint  Chief Complaint Free Text Note Form: No QASIM was made for this patient because she was discharged to a skilled nursing facility  Active Problems     1  Abnormal electrocardiogram (794 31) (R94 31)   2  Aftercare following surgery for injury and trauma (V58 43) (Z48 89)   3  Anemia associated with chemotherapy (285 3,E933 1) (D64 81)   4  Aortic stenosis, mild (424 1) (I35 0)   5  History of Arterial embolus and thrombosis (444 9) (I74 9)   6  Atrial fibrillation (427 31) (I48 91)   7  Benign essential hypertension (401 1) (I10)   8  Closed Colles' fracture of left radius, initial encounter (813 41) (S52 532A)   9  Depression with anxiety (300 4) (F41 8)   10  Diarrhea (787 91) (R19 7)   11  DVT (deep venous thrombosis) (453 40) (I82 409)   12  Dyslipidemia (272 4) (E78 5)   13  Encounter for screening mammogram for breast cancer (V76 12) (Z12 31)   14  Flu vaccine need (V04 81) (Z23)   15  Gait disturbance (781 2) (R26 9)   16  Generalized osteoarthritis of multiple sites (715 09) (M15 9)   17  Hip bursitis, right (726 5) (M70 71)   18  Hip pain, right (719 45) (M25 551)   19  Hyperbilirubinemia (782 4) (E80 6)   20  Hypokalemia (276 8) (E87 6)   21  Hypomagnesemia (275 2) (E83 42)   22  Insomnia (780 52) (G47 00)   23  Left Ankle Joint Pain (719 47)   24  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)   25  Left ventricular hypertrophy (429 3) (I51 7)   26  Liver metastasis (197 7) (C78 7)   27  Morbid or severe obesity due to excess calories (278 01) (E66 01)   28  Myalgia (729 1) (M79 1)   29  Need for prophylactic vaccination and inoculation against influenza (V04 81) (Z23)   30  Need for shingles vaccine (V04 89) (Z23)   31  Need for tetanus booster (V03 7) (Z23)   32  Need for vaccination with 13-polyvalent pneumococcal conjugate vaccine (V03 82) (Z23)   33  Osteoarthritis of right hip (836 95) (M16 11)   34  Osteoarthritis of right knee (015 96) (M17 11)   35   Osteoarthritis, localized, knee (715 36) (M17 10)   36  PFO (patent foramen ovale) (745 5) (Q21 1)   37  Proteinuria (791 0) (R80 9)   38  Pulmonary embolism (415 19) (I26 99)   39  Pulmonary hypertension (416 8) (I27 2)   40  Screening for colon cancer (V76 51) (Z12 11)   41  Screening for depression (V79 0) (Z13 89)   42  Screening for genitourinary condition (V81 6) (Z13 89)   43  Screening for hyperlipidemia (V77 91) (Z13 220)   44  Screening for neurological condition (V80 09) (Z13 89)   45  Visit for pre-operative examination (V72 84) (Z01 818)   46  Wrist fracture (814 00) (S62 109A)    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)       Lung metastases (197 0) (C78 00)          Past Medical History    1  History of Accelerated essential hypertension (401 0) (I10)   2  History of Acute upper respiratory infection (465 9) (J06 9)   3  History of Anemia due to acute blood loss (285 1) (D62)   4  History of Arterial embolus and thrombosis (444 9) (I74 9)   5  History of Drug-induced neutropenia (288 03,E980 5) (D70 2)   6  History of Drug-induced peripheral neuropathy (357 6,E980 5) (G62 0)   7  History of Hematoma of arm (923 9) (S40 029A)   8  History of essential hypertension (V12 59) (Z86 79)   9  History of Impacted cerumen of right ear (380 4) (H61 21)   10  History of Postmenopausal bleeding (627 1) (N95 0)   11  History of Uterine mass (625 8) (N85 9)    Surgical History    1  History of Arterial Embolectomy   2  History of Exploratory Laparotomy   3  History of Laparoscopy (Diagnostic)   4  History of Salpingo-oophorectomy Bilateral   5  History of Total Abdominal Hysterectomy   6  History of Tubal Ligation    Family History  Mother    1  Family history of Coronary Artery Disease (V17 49)   2  Family history of Depression   3  Family history of Diabetes Mellitus (V18 0)   4  Family history of Hypertension (V17 49)  Father    5  Family history of Coronary Artery Disease (V17 49)   6   Family history of pulmonary embolism (V17 49) (Z82 49)   7  Family history of Hypertension (V17 49)   8  Family history of S/P IVC filter    Social History    · Being A Social Drinker   · Denied: History of Drug Use   · Never a smoker   · Non smoker (V49 89) (Z78 9)    Current Meds   1  ALPRAZolam 0 25 MG Oral Tablet; TAKE 1 TABLET DAILY AS NEEDED FOR ANXIETY; Therapy: 93LUX4292 to [phone])  Requested for: 18Phn4493; Last   Rx:92Kpj2556 Ordered   2  Aspirin 81 MG TABS; TAKE 1 TABLET DAILY; Therapy: 89Jlv7583 to Recorded   3  Calcium 600+D 600-200 MG-UNIT Oral Tablet; Take 1 tablet daily; Therapy: 48ODD2531 to Recorded   4  ClonazePAM 0 5 MG Oral Tablet; Take 1 tablet twice daily; Therapy: 93Ijl3882 to (Evaluate:07Cwg2230)  Requested for: 69JXT4046; Last   Rx:23May2017 Ordered   5  Eliquis 5 MG Oral Tablet; take 1 tablet every twelve hours; Therapy: 44Wmt4143 to (Evaluate:07Smo9936)  Requested for: 55QWG8590; Last   Rx:03Mar2017 Ordered   6  Escitalopram Oxalate 20 MG Oral Tablet; take 1 tablet every day; Therapy: 02AFI0747 to (Evaluate:01Uvz7984)  Requested for: 20Mar2017; Last   Rx:20Mar2017 Ordered   7  Ferrous Sulfate 325 (65 Fe) MG Oral Tablet; TAKE 1 TABLET TWICE DAILY; Therapy: 44BKE9284 to (Evaluate:25Jun2017)  Requested for: 64LTA7337; Last   Rx:26May2017 Ordered   8  Folic Acid 1 MG Oral Tablet; TAKE 1 TABLET DAILY; Therapy: 68JGA9857 to (Evaluate:25Jun2017)  Requested for: 31GUT9256; Last   Rx:26May2017 Ordered   9  HydroCHLOROthiazide 25 MG Oral Tablet; TAKE 1 TABLET DAILY; Therapy: 66TCY6748 to (Noelle Doe)  Requested for: 89ONX3581; Last   Rx:01May2017 Ordered   10  Lisinopril 40 MG Oral Tablet; take one tablet by mouth every day; Therapy: 52UAJ6717 to (Evaluate:16Ree7631)  Requested for: 97KSR2023; Last    Rx:58Jll2600 Ordered   11  Metoprolol Tartrate 25 MG Oral Tablet; TAKE 1 TABLET TWICE DAILY; Therapy: 89COK6191 to (Evaluate:93Fod2650)  Requested for: 22Yuw0559; Last    Rx:22Adv1643 Ordered   12  Vitamin C 500 MG Oral Tablet; Take 1 tablet twice daily; Therapy: 30YFK0536 to (Evaluate:25Jun2017)  Requested for: 88XVL3720; Last    Rx:78Hgu3853 Ordered   13  Vitamin D3 1000 UNIT Oral Tablet; TAKE 1 TABLET DAILY; Therapy: 47ZRQ8741 to (Alicia Yelitza)  Requested for: 26Cdv3818; Last    Rx:87Mzs2089 Ordered    Allergies    1  No Known Drug Allergies    2  No Known Environmental Allergies   3  No Known Food Allergies    Health Management  Encounter for screening mammogram for breast cancer   Digital Bilateral Screening Mammogram With CAD; every 1 year; Next Due: 84ZOD5107; Overdue  Screening for colon cancer   COLONOSCOPY; every 10 years; Deferred until 09Aug2017: Pt Refuses; Temporary Deferral  Health Maintenance Medicare Annual Wellness Visit; every 1 year; Next Due: 26XXM5023; Overdue    Future Appointments    Date/Time Provider Specialty Site   07/05/2017 02:15 PM MASSIEL Mathis  Orthopedic Surgery [facility]   07/10/2017 09:45 AM MASSIEL Mathis  [address] [place] [address]   07/18/2017 05:30 PM MASSIEL Mtahis  Orthopedic Surgery [facility]   07/25/2017 03:15 PM MASSIEL Mathis  Orthopedic Surgery [address]   06/29/2017 10:00 AM Ana María Victoria MD Cardiology [facility] CARDIOLOGY [facility]   07/03/2017 11:00 AM MASSIEL Salgado   Orthopedic [address] [place] [address]     Signatures   Electronically signed by : Kajal Zarate, ; Jun 5 2017  9:34AM EST                       (Author)    Electronically signed by : MASSIEL Fitch ; Jun 21 2017  5:57PM EST                       (Review)

## 2018-01-16 NOTE — MISCELLANEOUS
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 1) (D64 81)   4  Aortic stenosis, mild (424 1) (I35 0)   5  History of Arterial embolus and thrombosis (444 9) (I74 9)   6  Atrial fibrillation (427 31) (I48 91)   7  Benign essential hypertension (401 1) (I10)   8  Closed Colles' fracture of left radius, initial encounter (813 41) (S52 532A)   9  Depression with anxiety (300 4) (F41 8)   10  Diarrhea (787 91) (R19 7)   11  DVT (deep venous thrombosis) (453 40) (I82 409)   12  Dyslipidemia (272 4) (E78 5)   13  Encounter for screening mammogram for breast cancer (V76 12) (Z12 31)   14  Flu vaccine need (V04 81) (Z23)   15  Gait disturbance (781 2) (R26 9)   16  Generalized osteoarthritis of multiple sites (715 09) (M15 9)   17  Hip bursitis, right (726 5) (M70 71)   18  Hip pain, right (719 45) (M25 551)   19  Hyperbilirubinemia (782 4) (E80 6)   20  Hypokalemia (276 8) (E87 6)   21  Hypomagnesemia (275 2) (E83 42)   22  Insomnia (780 52) (G47 00)   23  Left Ankle Joint Pain (719 47)   24  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)   25  Left ventricular hypertrophy (429 3) (I51 7)   26  Liver metastasis (197 7) (C78 7)   27  Lower back pain (724 2) (M54 5)   28  Lung metastases (197 0) (C78 00)   29  Morbid or severe obesity due to excess calories (278 01) (E66 01)   30  Myalgia (729 1) (M79 1)   31  Need for prophylactic vaccination and inoculation against influenza (V04 81) (Z23)   32  Need for shingles vaccine (V04 89) (Z23)   33  Need for tetanus booster (V03 7) (Z23)   34  Need for vaccination with 13-polyvalent pneumococcal conjugate vaccine (V03 82) (Z23)   35  Osteoarthritis of right hip (832 95) (M16 11)   36  Osteoarthritis of right knee (715 96) (M17 11)   37  Osteoarthritis, localized, knee (715 36) (M17 10)   38  PFO (patent foramen ovale) (745 5) (Q21 1)   39  Proteinuria (791 0) (R80 9)   40  Pulmonary embolism (415 19) (I26 99)   41  Pulmonary hypertension (416 8) (I27 2)   42  Screening for colon cancer (V76 51) (Z12 11)   43  Screening for depression (V79 0) (Z13 89)   44  Screening for genitourinary condition (V81 6) (Z13 89)   45  Screening for hyperlipidemia (V77 91) (Z13 220)   46  Screening for neurological condition (V80 09) (Z13 89)   47  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)   48  Visit for pre-operative examination (V72 84) (Z01 818)   49  Wrist fracture (814 00) (S62 109A)    Past Medical History    1  History of Accelerated essential hypertension (401 0) (I10)   2  History of Acute upper respiratory infection (465 9) (J06 9)   3  History of Anemia due to acute blood loss (285 1) (D62)   4  History of Arterial embolus and thrombosis (444 9) (I74 9)   5  History of Drug-induced neutropenia (288 03,E980 5) (D70 2)   6  History of Drug-induced peripheral neuropathy (357 6,E980 5) (G62 0)   7  History of Hematoma of arm (923 9) (S40 029A)   8  History of essential hypertension (V12 59) (Z86 79)   9  History of Impacted cerumen of right ear (380 4) (H61 21)   10  History of Postmenopausal bleeding (627 1) (N95 0)   11  History of Uterine mass (625 8) (N85 9)    Surgical History    1  History of Arterial Embolectomy   2  History of Exploratory Laparotomy   3  History of Laparoscopy (Diagnostic)   4  History of Salpingo-oophorectomy Bilateral   5  History of Total Abdominal Hysterectomy   6  History of Tubal Ligation    Family History  Mother    1  Family history of Coronary Artery Disease (V17 49)   2  Family history of Depression   3  Family history of Diabetes Mellitus (V18 0)   4  Family history of Hypertension (V17 49)  Father    5  Family history of Coronary Artery Disease (V17 49)   6   Family history of pulmonary embolism (V17 49) (Z82 49)   7  Family history of Hypertension (V17 49)   8  Family history of S/P IVC filter    Social History    · Being A Social Drinker   · Denied: History of Drug Use   · Never a smoker   · Non smoker (V49 89) (Z78 9)    Current Meds   1  ALPRAZolam 0 25 MG Oral Tablet; TAKE 1 TABLET DAILY AS NEEDED FOR ANXIETY; Therapy: 57OBJ6341 to [phone])  Requested for: 45Uyn3192; Last   Rx:10Ymm2676 Ordered   2  Aspirin 81 MG TABS; TAKE 1 TABLET DAILY; Therapy: 84Bib3805 to Recorded   3  Calcium 600+D 600-200 MG-UNIT Oral Tablet; Take 1 tablet daily; Therapy: 44CJE7504 to Recorded   4  Dulcolax 10 MG Rectal Suppository; Therapy: (Dominik Lee) to Recorded   5  Eliquis 5 MG Oral Tablet; take 1 tablet every twelve hours; Therapy: 93Nqo0797 to (Evaluate:65Lkr8317)  Requested for: 42OVX3556; Last   Rx:03Mar2017 Ordered   6  Escitalopram Oxalate 20 MG Oral Tablet; take 1 tablet every day; Therapy: 83DWJ9546 to (Evaluate:04Qlp8233)  Requested for: 20Mar2017; Last   Rx:20Mar2017 Ordered   7  Ferrous Sulfate 325 (65 Fe) MG Oral Tablet; TAKE 1 TABLET TWICE DAILY; Therapy: 71XTR6029 to (Evaluate:25Jun2017)  Requested for: 70KXW3013; Last   Rx:26May2017 Ordered   8  Folic Acid 1 MG Oral Tablet; TAKE 1 TABLET DAILY; Therapy: 09PNY9048 to (Evaluate:25Jun2017)  Requested for: 24TFH2332; Last   Rx:26May2017 Ordered   9  Maalox Advanced 874-695-15 MG/5ML SUSP; Therapy: (Dominik Lee) to Recorded   10  MiraLax Oral Packet; Therapy: (Dominik Lee) to Recorded   11  OxyCODONE HCl - 5 MG Oral Capsule; Therapy: (Dominik Lee) to Recorded   12  Senna-Docusate Sodium 8 6-50 MG Oral Tablet; Therapy: (Dominik Lee) to Recorded   13  Sotalol HCl - 80 MG Oral Tablet; TAKE 1 TABLET EVERY 12 HOURS DAILY; Therapy: (Dominik Lee) to Recorded   14  Tylenol 325 MG Oral Tablet; Therapy: (Dominik Learn) to Recorded   15   Vitamin C 500 MG Oral Tablet; Take 1 tablet twice daily; Therapy: 61ESC0941 to (Evaluate:25Jun2017)  Requested for: 73HLP8862; Last    Rx:63Uon2662 Ordered   16  Vitamin D3 1000 UNIT Oral Tablet; TAKE 1 TABLET DAILY; Therapy: 95VLJ2042 to (Louise Eddy)  Requested for: 96Anr5207; Last    Rx:46Uie9773 Ordered    Allergies    1  No Known Drug Allergies    2  No Known Environmental Allergies   3  No Known Food Allergies    Health Management  Encounter for screening mammogram for breast cancer   Digital Bilateral Screening Mammogram With CAD; every 1 year; Next Due: 70XTA9359; Overdue  Screening for colon cancer   COLONOSCOPY; every 10 years; Deferred until 09Aug2017: Pt Refuses; Temporary Deferral  Health Maintenance Medicare Annual Wellness Visit; every 1 year; Next Due: 21RED5828;  Overdue    Signatures   Electronically signed by : Kathy Fowler, ; Jun 30 2017  7:36AM EST                       (Author)    Electronically signed by : MASSIEL Mahmood ; Jun 30 2017  8:05AM EST                       (Review)